# Patient Record
Sex: MALE | Race: WHITE | NOT HISPANIC OR LATINO | Employment: FULL TIME | URBAN - METROPOLITAN AREA
[De-identification: names, ages, dates, MRNs, and addresses within clinical notes are randomized per-mention and may not be internally consistent; named-entity substitution may affect disease eponyms.]

---

## 2017-09-06 ENCOUNTER — COMMUNICATION - HEALTHEAST (OUTPATIENT)
Dept: TELEHEALTH | Facility: CLINIC | Age: 47
End: 2017-09-06

## 2017-09-06 ENCOUNTER — OFFICE VISIT - HEALTHEAST (OUTPATIENT)
Dept: FAMILY MEDICINE | Facility: CLINIC | Age: 47
End: 2017-09-06

## 2017-09-06 DIAGNOSIS — Z30.09 ENCOUNTER FOR VASECTOMY COUNSELING: ICD-10-CM

## 2017-09-06 DIAGNOSIS — L91.8 SKIN TAGS, MULTIPLE ACQUIRED: ICD-10-CM

## 2017-09-06 DIAGNOSIS — Z00.00 ROUTINE GENERAL MEDICAL EXAMINATION AT A HEALTH CARE FACILITY: ICD-10-CM

## 2017-09-06 DIAGNOSIS — J30.2 SEASONAL ALLERGIC RHINITIS, UNSPECIFIED ALLERGIC RHINITIS TRIGGER: ICD-10-CM

## 2017-09-06 DIAGNOSIS — R06.83 SNORING: ICD-10-CM

## 2017-09-06 LAB
CHOLEST SERPL-MCNC: 177 MG/DL
FASTING STATUS PATIENT QL REPORTED: YES
HDLC SERPL-MCNC: 37 MG/DL
LDLC SERPL CALC-MCNC: 116 MG/DL
PSA SERPL-MCNC: 0.9 NG/ML (ref 0–2.5)
TRIGL SERPL-MCNC: 119 MG/DL

## 2017-09-06 ASSESSMENT — MIFFLIN-ST. JEOR: SCORE: 1780.92

## 2017-09-11 ENCOUNTER — AMBULATORY - HEALTHEAST (OUTPATIENT)
Dept: FAMILY MEDICINE | Facility: CLINIC | Age: 47
End: 2017-09-11

## 2017-09-11 DIAGNOSIS — E55.9 VITAMIN D DEFICIENCY: ICD-10-CM

## 2017-10-17 ENCOUNTER — RECORDS - HEALTHEAST (OUTPATIENT)
Dept: ADMINISTRATIVE | Facility: OTHER | Age: 47
End: 2017-10-17

## 2017-11-20 ENCOUNTER — COMMUNICATION - HEALTHEAST (OUTPATIENT)
Dept: LAB | Facility: CLINIC | Age: 47
End: 2017-11-20

## 2017-11-20 DIAGNOSIS — R74.8 ABNORMAL LIVER ENZYMES: ICD-10-CM

## 2017-11-20 DIAGNOSIS — E55.9 VITAMIN D DEFICIENCY: ICD-10-CM

## 2017-12-11 ENCOUNTER — AMBULATORY - HEALTHEAST (OUTPATIENT)
Dept: LAB | Facility: CLINIC | Age: 47
End: 2017-12-11

## 2017-12-11 DIAGNOSIS — E55.9 VITAMIN D DEFICIENCY: ICD-10-CM

## 2017-12-11 DIAGNOSIS — R74.8 ABNORMAL LIVER ENZYMES: ICD-10-CM

## 2017-12-14 ENCOUNTER — COMMUNICATION - HEALTHEAST (OUTPATIENT)
Dept: FAMILY MEDICINE | Facility: CLINIC | Age: 47
End: 2017-12-14

## 2017-12-14 DIAGNOSIS — E55.9 VITAMIN D DEFICIENCY: ICD-10-CM

## 2018-03-06 ENCOUNTER — COMMUNICATION - HEALTHEAST (OUTPATIENT)
Dept: ADMINISTRATIVE | Facility: CLINIC | Age: 48
End: 2018-03-06

## 2019-06-24 ENCOUNTER — OFFICE VISIT - HEALTHEAST (OUTPATIENT)
Dept: FAMILY MEDICINE | Facility: CLINIC | Age: 49
End: 2019-06-24

## 2019-06-24 DIAGNOSIS — Z00.00 ROUTINE GENERAL MEDICAL EXAMINATION AT A HEALTH CARE FACILITY: ICD-10-CM

## 2019-06-24 DIAGNOSIS — Z23 TETANUS-DIPHTHERIA (TD) VACCINATION: ICD-10-CM

## 2019-06-24 DIAGNOSIS — Z23 NEED FOR HEPATITIS A VACCINATION: ICD-10-CM

## 2019-06-24 LAB
ALBUMIN SERPL-MCNC: 4.3 G/DL (ref 3.5–5)
ALP SERPL-CCNC: 57 U/L (ref 45–120)
ALT SERPL W P-5'-P-CCNC: 104 U/L (ref 0–45)
ANION GAP SERPL CALCULATED.3IONS-SCNC: 9 MMOL/L (ref 5–18)
AST SERPL W P-5'-P-CCNC: 58 U/L (ref 0–40)
BILIRUB SERPL-MCNC: 1 MG/DL (ref 0–1)
BUN SERPL-MCNC: 11 MG/DL (ref 8–22)
CALCIUM SERPL-MCNC: 9.8 MG/DL (ref 8.5–10.5)
CHLORIDE BLD-SCNC: 104 MMOL/L (ref 98–107)
CHOLEST SERPL-MCNC: 162 MG/DL
CO2 SERPL-SCNC: 29 MMOL/L (ref 22–31)
CREAT SERPL-MCNC: 1.14 MG/DL (ref 0.7–1.3)
FASTING STATUS PATIENT QL REPORTED: NORMAL
GFR SERPL CREATININE-BSD FRML MDRD: >60 ML/MIN/1.73M2
GLUCOSE BLD-MCNC: 104 MG/DL (ref 70–125)
HDLC SERPL-MCNC: 43 MG/DL
LDLC SERPL CALC-MCNC: 90 MG/DL
POTASSIUM BLD-SCNC: 4.4 MMOL/L (ref 3.5–5)
PROT SERPL-MCNC: 7.1 G/DL (ref 6–8)
PSA SERPL-MCNC: 0.6 NG/ML (ref 0–2.5)
SODIUM SERPL-SCNC: 142 MMOL/L (ref 136–145)
TRIGL SERPL-MCNC: 145 MG/DL

## 2019-06-24 ASSESSMENT — MIFFLIN-ST. JEOR: SCORE: 1754.38

## 2019-07-29 ENCOUNTER — COMMUNICATION - HEALTHEAST (OUTPATIENT)
Dept: TELEHEALTH | Facility: CLINIC | Age: 49
End: 2019-07-29

## 2019-07-29 ENCOUNTER — COMMUNICATION - HEALTHEAST (OUTPATIENT)
Dept: HEALTH INFORMATION MANAGEMENT | Facility: CLINIC | Age: 49
End: 2019-07-29

## 2020-07-27 ENCOUNTER — OFFICE VISIT - HEALTHEAST (OUTPATIENT)
Dept: FAMILY MEDICINE | Facility: CLINIC | Age: 50
End: 2020-07-27

## 2020-07-27 DIAGNOSIS — Z00.00 ROUTINE GENERAL MEDICAL EXAMINATION AT A HEALTH CARE FACILITY: ICD-10-CM

## 2020-07-27 DIAGNOSIS — R06.83 SNORES: ICD-10-CM

## 2020-07-27 DIAGNOSIS — J30.2 SEASONAL ALLERGIES: ICD-10-CM

## 2020-07-27 DIAGNOSIS — Z12.11 SCREEN FOR COLON CANCER: ICD-10-CM

## 2020-07-27 LAB
ALBUMIN SERPL-MCNC: 4.4 G/DL (ref 3.5–5)
ALP SERPL-CCNC: 59 U/L (ref 45–120)
ALT SERPL W P-5'-P-CCNC: 84 U/L (ref 0–45)
ANION GAP SERPL CALCULATED.3IONS-SCNC: 10 MMOL/L (ref 5–18)
AST SERPL W P-5'-P-CCNC: 36 U/L (ref 0–40)
BILIRUB SERPL-MCNC: 0.9 MG/DL (ref 0–1)
BUN SERPL-MCNC: 12 MG/DL (ref 8–22)
CALCIUM SERPL-MCNC: 9.6 MG/DL (ref 8.5–10.5)
CHLORIDE BLD-SCNC: 103 MMOL/L (ref 98–107)
CHOLEST SERPL-MCNC: 171 MG/DL
CO2 SERPL-SCNC: 28 MMOL/L (ref 22–31)
CREAT SERPL-MCNC: 1.31 MG/DL (ref 0.7–1.3)
FASTING STATUS PATIENT QL REPORTED: YES
GFR SERPL CREATININE-BSD FRML MDRD: 58 ML/MIN/1.73M2
GLUCOSE BLD-MCNC: 205 MG/DL (ref 70–125)
HDLC SERPL-MCNC: 40 MG/DL
LDLC SERPL CALC-MCNC: 101 MG/DL
POTASSIUM BLD-SCNC: 4.9 MMOL/L (ref 3.5–5)
PROT SERPL-MCNC: 7.2 G/DL (ref 6–8)
PSA SERPL-MCNC: 0.7 NG/ML (ref 0–3.5)
SODIUM SERPL-SCNC: 141 MMOL/L (ref 136–145)
TRIGL SERPL-MCNC: 151 MG/DL

## 2020-07-27 ASSESSMENT — MIFFLIN-ST. JEOR: SCORE: 1745.99

## 2020-08-26 ENCOUNTER — RECORDS - HEALTHEAST (OUTPATIENT)
Dept: ADMINISTRATIVE | Facility: OTHER | Age: 50
End: 2020-08-26

## 2020-10-14 ENCOUNTER — VIRTUAL VISIT (OUTPATIENT)
Dept: SLEEP MEDICINE | Facility: CLINIC | Age: 50
End: 2020-10-14
Payer: COMMERCIAL

## 2020-10-14 DIAGNOSIS — R29.818 SUSPECTED SLEEP APNEA: Primary | ICD-10-CM

## 2020-10-14 DIAGNOSIS — R06.81 WITNESSED APNEIC SPELLS: ICD-10-CM

## 2020-10-14 DIAGNOSIS — R06.83 SNORING: ICD-10-CM

## 2020-10-14 PROCEDURE — 99203 OFFICE O/P NEW LOW 30 MIN: CPT | Mod: GT | Performed by: INTERNAL MEDICINE

## 2020-10-14 NOTE — PATIENT INSTRUCTIONS
"MY TREATMENT INFORMATION FOR SLEEP APNEA-  Tyler Turk    DOCTOR : Jacob Valverde MD, MD  SLEEP CENTER :      MY CONTACT NUMBER:     Am I having a sleep study at a sleep center?  Make sure you have an appointment for the study before you leave!    Am I having a home sleep study?  Watch this video:  /drop off device-   Https://[email or web address]/watch?v=yGGFBdELGhk  Disposable device sent out require phone/computer application-   [email or web address]/watch?v=BCce_vbiwxE  Please verify your insurance coverage with your insurance carrier    Frequently asked questions:  1. What is Obstructive Sleep Apnea (RONEY)? RONEY is the most common type of sleep apnea. Apnea means, \"without breath.\"  Apnea is most often caused by narrowing or collapse of the upper airway as muscles relax during sleep.   Almost everyone has occasional apneas. Most people with sleep apnea have had brief interruptions at night frequently for many years.  The severity of sleep apnea is related to how frequent and severe the events are.   2. What are the consequences of RONEY? Symptoms include: feeling sleepy during the day, snoring loudly, gasping or stopping of breathing, trouble sleeping, and occasionally morning headaches or heartburn at night.  Sleepiness can be serious and even increase the risk of falling asleep while driving. Other health consequences may include development of high blood pressure and other cardiovascular disease in persons who are susceptible. Untreated RONEY  can contribute to heart disease, stroke and diabetes.   3. What are the treatment options? In most situations, sleep apnea is a lifelong disease that must be managed with daily therapy. Medications are not effective for sleep apnea and surgery is generally not considered until other therapies have been tried. Your treatment is your choice . Continuous Positive Airway (CPAP) works right away and is the therapy that is effective in nearly everyone. An oral device to " hold your jaw forward is usually the next most reliable option. Other options include postioning devices (to keep you off your back), weight loss, and surgery including a tongue pacing device. There is more detail about some of these options below.    Important tips for using CPAP and similar devices   Know your equipment:  CPAP is continuous positive airway pressure that prevents obstructive sleep apnea by keeping the throat from collapsing while you are sleeping. In most cases, the device is  smart  and can slowly self-adjusts if your throat collapses and keeps a record every day of how well you are treated-this information is available to you and your care team.  BPAP is bilevel positive airway pressure that keeps your throat open and also assists each breath with a pressure boost to maintain adequate breathing.  Special kinds of BPAP are used in patients who have inadequate breathing from lung or heart disease. In most cases, the device is  smart  and can slowly self-adjusts to assist breathing. Like CPAP, the device keeps a record of how well you are treated.  Your mask is your connection to the device. You get to choose what feels most comfortable and the staff will help to make sure if fits. Here: are some examples of the different masks that are available:       Key points to remember on your journey with sleep apnea:  1. Sleep study.  PAP devices often need to be adjusted during a sleep study to show that they are effective and adjusted right.  2. Good tips to remember: Try wearing just the mask during a quiet time during the day so your body adapts to wearing it. A humidifier is recommended for comfort in most cases to prevent drying of your nose and throat. Allergy medication from your provider may help you if you are having nasal congestion.  3. Getting settled-in. It takes more than one night for most of us to get used to wearing a mask. Try wearing just the mask during a quiet time during the day so  your body adapts to wearing it. A humidifier is recommended for comfort in most cases. Our team will work with you carefully on the first day and will be in contact within 4 days and again at 2 and 4 weeks for advice and remote device adjustments. Your therapy is evaluated by the device each day.   4. Use it every night. The more you are able to sleep naturally for 7-8 hours, the more likely you will have good sleep and to prevent health risks or symptoms from sleep apnea. Even if you use it 4 hours it helps. Occasionally all of us are unable to use a medical therapy, in sleep apnea, it is not dangerous to miss one night.   5. Communicate. Call our skilled team on the number provided on the first day if your visit for problems that make it difficult to wear the device. Over 2 out of 3 patients can learn to wear the device long-term with help from our team. Remember to call our team or your sleep providers if you are unable to wear the device as we may have other solutions for those who cannot adapt to mask CPAP therapy. It is recommended that you sleep your sleep provider within the first 3 months and yearly after that if you are not having problems.   6. Use it for your health. We encourage use of CPAP masks during daytime quiet periods to allow your face and brain to adapt to the sensation of CPAP so that it will be a more natural sensation to awaken to at night or during naps. This can be very useful during the first few weeks or months of adapting to CPAP though it does not help medically to wear CPAP during wakefulness and  should not be used as a strategy just to meet guidelines.  7. Take care of your equipment. Make sure you clean your mask and tubing using directions every day and that your filter and mask are replaced as recommended or if they are not working.     BESIDES CPAP, WHAT OTHER THERAPIES ARE THERE?    Positioning Device  Positioning devices are generally used when sleep apnea is mild and only  occurs on your back.This example shows a pillow that straps around the waist. It may be appropriate for those whose sleep study shows milder sleep apnea that occurs primarily when lying flat on one's back. Preliminary studies have shown benefit but effectiveness at home may need to be verified by a home sleep test. These devices are generally not covered by medical insurance.  Examples of devices that maintain sleeping on the back to prevent snoring and mild sleep apnea.    Belt type body positioner  Http://Nephera.Hulafrog/    Electronic reminder  Http://nightshifttherapy.com/  Http://[email or web address].Hulafrog.au/      Oral Appliance  What is oral appliance therapy?  An oral appliance device fits on your teeth at night like a retainer used after having braces. The device is made by a specialized dentist and requires several visits over 1-2 months before a manufactured device is made to fit your teeth and is adjusted to prevent your sleep apnea. Once an oral device is working properly, snoring should be improved. A home sleep test may be recommended at that time if to determine whether the sleep apnea is adequately treated.       Some things to remember:  -Oral devices are often, but not always, covered by your medical insurance. Be sure to check with your insurance provider.   -If you are referred for oral therapy, you will be given a list of specialized dentists to consider or you may choose to visit the Web site of the American Academy of Dental Sleep Medicine  -Oral devices are less likely to work if you have severe sleep apnea or are extremely overweight.     More detailed information  An oral appliance is a small acrylic device that fits over the upper and lower teeth  (similar to a retainer or a mouth guard). This device slightly moves jaw forward, which moves the base of the tongue forward, opens the airway, improves breathing for effective treat snoring and obstructive sleep apnea in perhaps 7 out of 10 people .  The  best working devices are custom-made by a dental device  after a mold is made of the teeth 1, 2, 3.  When is an oral appliance indicated?  Oral appliance therapy is recommended as a first-line treatment for patients with primary snoring, mild sleep apnea, and for patients with moderate sleep apnea who prefer appliance therapy to use of CPAP4, 5. Severity of sleep apnea is determined by sleep testing and is based on the number of respiratory events per hour of sleep.   How successful is oral appliance therapy?  The success rate of oral appliance therapy in patients with mild sleep apnea is 75-80% while in patients with moderate sleep apnea it is 50-70%. The chance of success in patients with severe sleep apnea is 40-50%. The research also shows that oral appliances have a beneficial effect on the cardiovascular health of RONEY patients at the same magnitude as CPAP therapy7.  Oral appliances should be a second-line treatment in cases of severe sleep apnea, but if not completely successful then a combination therapy utilizing CPAP plus oral appliance therapy may be effective. Oral appliances tend to be effective in a broad range of patients although studies show that the patients who have the highest success are females, younger patients, those with milder disease, and less severe obesity. 3, 6.   Finding a dentist that practices dental sleep medicine  Specific training is available through the American Academy of Dental Sleep Medicine for dentists interested in working in the field of sleep. To find a dentist who is educated in the field of sleep and the use of oral appliances, near you, visit the Web site of the American Academy of Dental Sleep Medicine.    References  1. Lazaro et al. Objectively measured vs self-reported compliance during oral appliance therapy for sleep-disordered breathing. Chest 2013; 144(5): 4154-4304.  2. Brenda et al. Objective measurement of compliance during oral  appliance therapy for sleep-disordered breathing. Thorax 2013; 68(1): 91-96.  3. Renetta, et al. Mandibular advancement devices in 620 men and women with RONEY and snoring: tolerability and predictors of treatment success. Chest 2004; 125: 3681-5937.  4. Micaela et al. Oral appliances for snoring and RONEY: a review. Sleep 2006; 29: 244-262.  5. Fausto et al. Oral appliance treatment for RONEY: an update. J Clin Sleep Med 2014; 10(2): 215-227.  6. Audelia et al. Predictors of OSAH treatment outcome. J Dent Res 2007; 86: 2055-3702.      Weight Loss:    Weight loss is a long-term strategy that may improve sleep apnea in some patients.    Weight management is a personal decision and the decision should be based on your interest and the potential benefits.  If you are interested in exploring weight loss strategies, the following discussion covers the impact on weight loss on sleep apnea and the approaches that may be successful.    Being overweight does not necessarily mean you will have health consequences.  Those who have BMI over 35 or over 27 with existing medical conditions carries greater risk.   Weight loss decreases severity of sleep apnea in most people with obesity. For those with mild obesity who have developed snoring with weight gain, even 15-30 pound weight loss can improve and occasionally eliminate sleep apnea.  Structured and life-long dietary and health habits are necessary to lose weight and keep healthier weight levels.     Though there may be significant health benefits from weight loss, long-term weight loss is very difficult to achieve- studies show success with dietary management in less than 10% of people. In addition, substantial weight loss may require years of dietary control and may be difficult if patients have severe obesity. In these cases, surgical management may be considered.  Finally, older individuals who have tolerated obesity without health complications may be less likely to  benefit from weight loss strategies.        Your BMI is There is no height or weight on file to calculate BMI.  Weight management is a personal decision.  If you are interested in exploring weight loss strategies, the following discussion covers the approaches that may be successful. Body mass index (BMI) is one way to tell whether you are at a healthy weight, overweight, or obese. It measures your weight in relation to your height.  A BMI of 18.5 to 24.9 is in the healthy range. A person with a BMI of 25 to 29.9 is considered overweight, and someone with a BMI of 30 or greater is considered obese. More than two-thirds of American adults are considered overweight or obese.  Being overweight or obese increases the risk for further weight gain. Excess weight may lead to heart disease and diabetes.  Creating and following plans for healthy eating and physical activity may help you improve your health.  Weight control is part of healthy lifestyle and includes exercise, emotional health, and healthy eating habits. Careful eating habits lifelong are the mainstay of weight control. Though there are significant health benefits from weight loss, long-term weight loss with diet alone may be very difficult to achieve- studies show long-term success with dietary management in less than 10% of people. Attaining a healthy weight may be especially difficult to achieve in those with severe obesity. In some cases, medications, devices and surgical management might be considered.  What can you do?  If you are overweight or obese and are interested in methods for weight loss, you should discuss this with your provider.     Consider reducing daily calorie intake by 500 calories.     Keep a food journal.     Avoiding skipping meals, consider cutting portions instead.    Diet combined with exercise helps maintain muscle while optimizing fat loss. Strength training is particularly important for building and maintaining muscle mass.  Exercise helps reduce stress, increase energy, and improves fitness. Increasing exercise without diet control, however, may not burn enough calories to loose weight.       Start walking three days a week 10-20 minutes at a time    Work towards walking thirty minutes five days a week     Eventually, increase the speed of your walking for 1-2 minutes at time    In addition, we recommend that you review healthy lifestyles and methods for weight loss available through the National Institutes of Health patient information sites:  http://win.niddk.nih.gov/publications/index.htm    And look into health and wellness programs that may be available through your health insurance provider, employer, local community center, or hi club.    Weight management plan: Patient was referred to their PCP to discuss a diet and exercise plan.      Surgery:    Surgery for obstructive sleep apnea is considered generally only when other therapies fail to work. Surgery may be discussed with you if you are having a difficult time tolerating CPAP and or when there is an abnormal structure that requires surgical correction.  Nose and throat surgeries often enlarge the airway to prevent collapse.  Most of these surgeries create pain for 1-2 weeks and up to half of the most common surgeries are not effective throughout life.  You should carefully discuss the benefits and drawbacks to surgery with your sleep provider and surgeon to determine if it is the best solution for you.   More information  Surgery for RONEY is directed at areas that are responsible for narrowing or complete obstruction of the airway during sleep.  There are a wide range of procedures available to enlarge and/or stabilize the airway to prevent blockage of breathing in the three major areas where it can occur: the palate, tongue, and nasal regions.  Successful surgical treatment depends on the accurate identification of the factors responsible for obstructive sleep apnea in  each person.  A personalized approach is required because there is no single treatment that works well for everyone.  Because of anatomic variation, consultation with an examination by a sleep surgeon is a critical first step in determining what surgical options are best for each patient.  In some cases, examination during sedation may be recommended in order to guide the selection of procedures.  Patients will be counseled about risks and benefits as well as the typical recovery course after surgery. Surgery is typically not a cure for a person s RONEY.  However, surgery will often significantly improve one s RONEY severity (termed  success rate ).  Even in the absence of a cure, surgery will decrease the cardiovascular risk associated with OSA7; improve overall quality of life8 (sleepiness, functionality, sleep quality, etc).      Palate Procedures:  Patients with RONEY often have narrowing of their airway in the region of their tonsils and uvula.  The goals of palate procedures are to widen the airway in this region as well as to help the tissues resist collapse.  Modern palate procedure techniques focus on tissue conservation and soft tissue rearrangement, rather than tissue removal.  Often the uvula is preserved in this procedure. Residual sleep apnea is common in patient after pharyngoplasty with an average reduction in sleep apnea events of 33%2.      Tongue Procedures:  ExamWhile patients are awake, the muscles that surround the throat are active and keep this region open for breathing. These muscles relax during sleep, allowing the tongue and other structures to collapse and block breathing.  There are several different tongue procedures available.  Selection of a tongue base procedure depends on characteristics seen on physical exam.  Generally, procedures are aimed at removing bulky tissues in this area or preventing the back of the tongue from falling back during sleep.  Success rates for tongue surgery range  from 50-62%3.    Hypoglossal Nerve Stimulation:  Hypoglossal nerve stimulation has recently received approval from the [place] Food and Drug Administration for the treatment of obstructive sleep apnea.  This is based on research showing that the system was safe and effective in treating sleep apnea6.  Results showed that the median AHI score decreased 68%, from 29.3 to 9.0. This therapy uses an implant system that senses breathing patterns and delivers mild stimulation to airway muscles, which keeps the airway open during sleep.  The system consists of three fully implanted components: a small generator (similar in size to a pacemaker), a breathing sensor, and a stimulation lead.  Using a small handheld remote, a patient turns the therapy on before bed and off upon awakening.    Candidates for this device must be greater than 22 years of age, have moderate to severe RONEY (AHI between 20-65), BMI less than 32, have tried CPAP/oral appliance without success, and have appropriate upper airway anatomy (determined by a sleep endoscopy performed by Dr. Calvillo).    Hypoglossal Nerve Stimulation Pathway:    The sleep surgeon s office will work with the patient through the insurance prior-authorization process (including communications and appeals).    Nasal Procedures:  Nasal obstruction can interfere with nasal breathing during the day and night.  Studies have shown that relief of nasal obstruction can improve the ability of some patients to tolerate positive airway pressure therapy for obstructive sleep apnea1.  Treatment options include medications such as nasal saline, topical corticosteroid and antihistamine sprays, and oral medications such as antihistamines or decongestants. Non-surgical treatments can include external nasal dilators for selected patients. If these are not successful by themselves, surgery can improve the nasal airway either alone or in combination with these other options.      Combination  Procedures:  Combination of surgical procedures and other treatments may be recommended, particularly if patients have more than one area of narrowing or persistent positional disease.  The success rate of combination surgery ranges from 66-80%2,3.    References  1. Irene MONTOYA. The Role of the Nose in Snoring and Obstructive Sleep Apnoea: An Update.  Eur Arch Otorhinolaryngol. 2011; 268: 1365-73.  2.  Francesco SM; Jordan JA; Too JR; Pallanch JF; Lory MB; Kasey SG; Katarzyna MORELOS. Surgical modifications of the upper airway for obstructive sleep apnea in adults: a systematic review and meta-analysis. SLEEP 2010;33(10):5177-3345. Pradip VELEZ. Hypopharyngeal surgery in obstructive sleep apnea: an evidence-based medicine review.  Arch Otolaryngol Head Neck Surg. 2006 Feb;132(2):206-13.  3. Mono YH1, Lima Y, Dayne MICHAEL. The efficacy of anatomically based multilevel surgery for obstructive sleep apnea. Otolaryngol Head Neck Surg. 2003 Oct;129(4):327-35.  4. Pradip VELEZ, Goldberg A. Hypopharyngeal Surgery in Obstructive Sleep Apnea: An Evidence-Based Medicine Review. Arch Otolaryngol Head Neck Surg. 2006 Feb;132(2):206-13.  5. Jeniffer TREJO et al. Upper-Airway Stimulation for Obstructive Sleep Apnea.  N Engl J Med. 2014 Jan 9;370(2):139-49.  6. Chantel Y et al. Increased Incidence of Cardiovascular Disease in Middle-aged Men with Obstructive Sleep Apnea. Am J Respir Crit Care Med; 2002 166: 159-165  7. Neda FERNANDEZ et al. Studying Life Effects and Effectiveness of Palatopharyngoplasty (SLEEP) study: Subjective Outcomes of Isolated Uvulopalatopharyngoplasty. Otolaryngol Head Neck Surg. 2011; 144: 623-631.

## 2020-10-14 NOTE — PROGRESS NOTES
"Tyler Turk is a 50 year old male who is being evaluated via a billable video visit.      The patient has been notified of following:     \"This video visit will be conducted via a call between you and your physician/provider. We have found that certain health care needs can be provided without the need for an in-person physical exam.  This service lets us provide the care you need with a video conversation.  If a prescription is necessary we can send it directly to your pharmacy.  If lab work is needed we can place an order for that and you can then stop by our lab to have the test done at a later time.    Video visits are billed at different rates depending on your insurance coverage.  Please reach out to your insurance provider with any questions.    If during the course of the call the physician/provider feels a video visit is not appropriate, you will not be charged for this service.\"    Patient has given verbal consent for Video visit? Yes  How would you like to obtain your AVS? MyChart  If you are dropped from the video visit, the video invite should be resent to: Send to e-mail at: [email or web address]  Will anyone else be joining your video visit? No        Video-Visit Details    Type of service:  Video Visit    Video Start Time: 10:20 AM  Video End Time: 10:52 AM    Originating Location (pt. Location): Home    Distant Location (provider location):  [facility] SLEEP [facility]     Platform used for Video Visit: Channing Valverde MD      Sleep Consultation:    Date on this visit: 10/14/2020    Tyler Turk is sent by Paddy Raymond for a sleep consultation regarding possible sleep apnea.    Primary Physician: Paddy Raymond     Chief complaint: snoring    Presenting History:     Tyler Turk reports nightly snoring and frequent witnessed apneas for last 2 years.     He doesnot have any significant medical comorbidity.     Tyler does snore every night. " Patient does have a regular bed partner. There is report of snoring.  He does have witnessed apneas. They never sleep separately.  Patient sleeps on his side. He denies no morning headaches and restless legs. Tyler denies any bruxism, sleep walking, sleep talking, dream enactment, sleep paralysis, cataplexy and hypnogogic/hypnopompic hallucinations.    Tyler goes to sleep at 11:00 PM during the week. He wakes up at 7:00 AM without an alarm. He falls asleep in 15 minutes.  Tyler denies difficulty falling asleep.  He wakes up 0-1 times a night for 10 minutes before falling back to sleep.  Tyler wakes up to go to the bathroom and uncertain reasons.  On weekends, Tyler goes to sleep at 11:00 PM.  He wakes up at 7:30 AM without an alarm. He falls asleep in 10 minutes.  Patient gets an average of 7-8 hours of sleep per night.     Tyler has difficulty breathing through his nose.      Patient feels tired during the day and has noticed some cognitive difficulties.  Patient's [place] Sleepiness score 9/24 consistent with no daytime sleepiness.      Tyler naps 3-4 times per week for 10-20 minutes, feels refreshed after naps. He takes some inadvertant naps.  He denies closing eyes, dozing and falling asleep while driving. Patient was counseled on the importance of driving while alert, to pull over if drowsy, or nap before getting into the vehicle if sleepy.      He uses 2 sodas/day. Last caffeine intake is usually before noon.    Allergies:    No Known Allergies    Medications:    No current outpatient medications on file.       Problem List:  There are no active problems to display for this patient.       Past Medical/Surgical History:    - Nothing significant     Social History:  Social History     Socioeconomic History     Marital status:      Spouse name: Not on file     Number of children: Not on file     Years of education: Not on file     Highest education level: Not on file   Occupational  History     Not on file   Social Needs     Financial resource strain: Not on file     Food insecurity     Worry: Not on file     Inability: Not on file     Transportation needs     Medical: Not on file     Non-medical: Not on file   Tobacco Use     Smoking status: Never Smoker     Smokeless tobacco: Never Used   Substance and Sexual Activity     Alcohol use: Not on file     Drug use: Not on file     Sexual activity: Not on file   Lifestyle     Physical activity     Days per week: Not on file     Minutes per session: Not on file     Stress: Not on file   Relationships     Social connections     Talks on phone: Not on file     Gets together: Not on file     Attends Alevism service: Not on file     Active member of club or organization: Not on file     Attends meetings of clubs or organizations: Not on file     Relationship status: Not on file     Intimate partner violence     Fear of current or ex partner: Not on file     Emotionally abused: Not on file     Physically abused: Not on file     Forced sexual activity: Not on file   Other Topics Concern     Not on file   Social History Narrative     Not on file       Family History:  Family History   Problem Relation Age of Onset     Sleep Apnea Father      Sleep Apnea Sister        Review of Systems:  A complete review of systems reviewed by me is negative with the exeption of what has been mentioned in the history of present illness.  CONSTITUTIONAL: NEGATIVE for weight gain/loss, fever, chills, sweats or night sweats, drug allergies.  EYES: NEGATIVE for changes in vision, blind spots, double vision.  ENT: NEGATIVE for ear pain, sore throat, sinus pain, post-nasal drip, runny nose, bloody nose  CARDIAC: NEGATIVE for fast heartbeats or fluttering in chest, chest pain or pressure, breathlessness when lying flat, swollen legs or swollen feet.  NEUROLOGIC: NEGATIVE headaches, weakness or numbness in the arms or legs.  DERMATOLOGIC: NEGATIVE for rashes, new moles or change  in mole(s)  PULMONARY: NEGATIVE SOB at rest, SOB with activity, dry cough, productive cough, coughing up blood, wheezing or whistling when breathing.    GASTROINTESTINAL: NEGATIVE for nausea or vomitting, loose or watery stools, fat or grease in stools, constipation, abdominal pain, bowel movements black in color or blood noted.  GENITOURINARY: NEGATIVE for pain during urination, blood in urine, urinating more frequently than usual, irregular menstrual periods.  MUSCULOSKELETAL: NEGATIVE for muscle pain, bone or joint pain, swollen joints.  ENDOCRINE: NEGATIVE for increased thirst or urination, diabetes.  LYMPHATIC: NEGATIVE for swollen lymph nodes, lumps or bumps in the breasts or nipple discharge.    Physical Examination:  Vitals: There were no vitals taken for this visit.  BMI= There is no height or weight on file to calculate BMI.         [place] Total Score 10/14/2020   Total score - [place] 9       MADY Total Score: 17 (10/14/20 1000)    GENERAL APPEARANCE: healthy, alert, active and no distress  EYES: Eyes grossly normal to inspection  HENT: Normocephalic   RESP: No cough, no dyspnea   MS: extremities normal- no gross deformities noted  SKIN: no suspicious lesions or rashes  NEURO: mentation intact and speech normal  PSYCH: mentation appears normal and affect normal/bright      Impression/Plan:    1. Probable Obstructive sleep apnea    - Patient is a 50 years old male, who presents with history of loud snoring, witnessed apneas and daytime fatigue. There is a high risk for sleep apnea and an overnight sleep study is recommended for further assessment.     Plan:     1. Home sleep apnea testing       Literature provided regarding sleep apnea.      He will follow up with me in approximately two weeks after his sleep study has been competed to review the results and discuss plan of care.       Polysomnography & HST reviewed.  Limitations of HST reviewed  Obstructive sleep apnea reviewed.  Complications of  untreated sleep apnea were reviewed.    I spent a total of 30 minutes for this appointment today which include time spent before, during and after the visit for patient care, counseling and coordination of care.    Dr. Jacob Valverde     CC: Paddy Raymond

## 2020-10-15 ENCOUNTER — TELEPHONE (OUTPATIENT)
Dept: SLEEP MEDICINE | Facility: CLINIC | Age: 50
End: 2020-10-15

## 2020-10-26 ENCOUNTER — OFFICE VISIT (OUTPATIENT)
Dept: SLEEP MEDICINE | Facility: CLINIC | Age: 50
End: 2020-10-26
Payer: COMMERCIAL

## 2020-10-26 DIAGNOSIS — R06.83 SNORING: ICD-10-CM

## 2020-10-26 DIAGNOSIS — R29.818 SUSPECTED SLEEP APNEA: ICD-10-CM

## 2020-10-26 DIAGNOSIS — G47.33 OSA (OBSTRUCTIVE SLEEP APNEA): ICD-10-CM

## 2020-10-26 DIAGNOSIS — R06.81 WITNESSED APNEIC SPELLS: ICD-10-CM

## 2020-10-26 PROCEDURE — G0399 HOME SLEEP TEST/TYPE 3 PORTA: HCPCS | Performed by: INTERNAL MEDICINE

## 2020-10-27 ENCOUNTER — DOCUMENTATION ONLY (OUTPATIENT)
Dept: SLEEP MEDICINE | Facility: CLINIC | Age: 50
End: 2020-10-27
Payer: COMMERCIAL

## 2020-10-28 PROBLEM — G47.33 OSA (OBSTRUCTIVE SLEEP APNEA): Status: ACTIVE | Noted: 2020-10-28

## 2020-10-28 NOTE — PROGRESS NOTES
This HSAT was performed using a Noxturnal T3 device which recorded snore, sound, movement activity, body position, nasal pressure, oronasal thermal airflow, pulse, oximetry and both chest and abdominal respiratory effort. HSAT data was restricted to the time patient states they were in bed.     HSAT was scored using 1B 4% hypopnea rule.     HST AHI (Non-PAT): 30.5  Snoring was reported as loud.  Time with SpO2 below 89% was 80 minutes.   Overall signal quality was fair. Poor airflow and pulse ox signals at times.      Pt will follow up with sleep provider to determine appropriate therapy.   HST POST-STUDY QUESTIONNAIRE    1. What time did you go to bed?  11  2. How long do you think it took to fall asleep?  40 min  3. What time did you wake up to start the day?  800  4. Did you get up during the night at all?  no  5. If you woke up, do you remember approximately what time(s)? no  6. Did you have any difficulty with the equipment?  No  7. Did you us any type of treatment with this study?  None  8. Was the head of the bed elevated? No  9. Did you sleep in a recliner?  No  10. Did you stop using CPAP at least 3 days before this test?  NA  11. Any other information you'd like us to know? none

## 2020-10-29 NOTE — PROCEDURES
HOME SLEEP STUDY INTERPRETATION    Patient: Tyler Turk  MRN: [id number]  YOB: 1970  Study Date: 10/26/2020  Referring Provider: Paddy Raymond;   Ordering Provider: Jacob Valverde MD, MD     Indications for Home Study: Tyler Turk is a 50 year old male with a history of no significant medical comorbidity who presents with symptoms suggestive of obstructive sleep apnea.    There is no height or weight on file to calculate BMI.  Total score - [place]: 9 (10/14/2020 10:00 AM)  STOP-BAN/8    Data: A full night home sleep study was performed recording the standard physiologic parameters including body position, movement, sound, nasal pressure, thermal oral airflow, chest and abdominal movements with respiratory inductance plethysmography, and oxygen saturation by pulse oximetry. Pulse rate was estimated by oximetry recording. This study was considered adequate based on > 4 hours of quality oximetry and respiratory recording. As specified by the AASM Manual for the Scoring of Sleep and Associated events, version 2.3, Rule VIII.D 1B, 4% oxygen desaturation scoring for hypopneas is used as a standard of care on all home sleep apnea testing.    Analysis Time:  528.8 minutes    Respiration:   Sleep Associated Hypoxemia: sustained hypoxemia was present. Baseline oxygen saturation was 90.8%.  Time with saturation less than or equal to 88% was 80 minutes. The lowest oxygen saturation was 78%.   Snoring: Snoring was present.  Respiratory events: The home study revealed a presence of 208 obstructive apneas and 7 mixed and central apneas. There were 54 hypopneas resulting in a combined apnea/hypopnea index [AHI] of 30.5 events per hour.  AHI was 30.7 per hour supine, - per hour prone, 16.2 per hour on left side, and - per hour on right side.   Pattern: Excluding events noted above, respiratory rate and pattern was Normal.    Position: Percent of time spent: supine - 98.6%, prone - 0%, on left  - 1.4%, on right - 0%.    Heart Rate: By pulse oximetry normal rate was noted.     Assessment:   Severe obstructive sleep apnea.  Sleep associated hypoxemia was present.    Recommendations:  Consider auto-CPAP at 5-15 cmH2O.  Weight management.    Diagnosis Code(s): Obstructive Sleep Apnea G47.33, Hypoxemia G47.36    Jacob Valverde MD, MD, October 28, 2020   Diplomate, American Board of Psychiatry and Neurology, Sleep Medicine

## 2020-11-10 ENCOUNTER — VIRTUAL VISIT (OUTPATIENT)
Dept: SLEEP MEDICINE | Facility: CLINIC | Age: 50
End: 2020-11-10
Payer: COMMERCIAL

## 2020-11-10 DIAGNOSIS — G47.33 OSA (OBSTRUCTIVE SLEEP APNEA): Primary | ICD-10-CM

## 2020-11-10 PROCEDURE — 99213 OFFICE O/P EST LOW 20 MIN: CPT | Mod: GT | Performed by: INTERNAL MEDICINE

## 2020-11-10 NOTE — PATIENT INSTRUCTIONS

## 2020-11-10 NOTE — PROGRESS NOTES
"Tyler Turk is a 50 year old male who is being evaluated via a billable video visit.      The patient has been notified of following:     \"This video visit will be conducted via a call between you and your physician/provider. We have found that certain health care needs can be provided without the need for an in-person physical exam.  This service lets us provide the care you need with a video conversation.  If a prescription is necessary we can send it directly to your pharmacy.  If lab work is needed we can place an order for that and you can then stop by our lab to have the test done at a later time.    Video visits are billed at different rates depending on your insurance coverage.  Please reach out to your insurance provider with any questions.    If during the course of the call the physician/provider feels a video visit is not appropriate, you will not be charged for this service.\"    Patient has given verbal consent for Video visit? Yes  How would you like to obtain your AVS? MyChart  If you are dropped from the video visit, the video invite should be resent to: Send to e-mail at: [email or web address]  Will anyone else be joining your video visit? No        Video-Visit Details    Type of service:  Video Visit    Video Start Time: 11:16 AM  Video End Time: 11:31 AM    Originating Location (pt. Location): Home    Distant Location (provider location):  [facility] SLEEP [facility]     Platform used for Video Visit: Felipe Valverde MD      Sleep Study Follow-Up Visit:    Date on this visit: 11/10/2020    Tyler Turk comes in today for follow-up of his sleep study done on 10/26/20 at the [facility] for possible sleep apnea.    Respiratory events: The home study revealed a presence of 208 obstructive apneas and 7 mixed and central apneas. There were 54 hypopneas resulting in a combined apnea/hypopnea index [AHI] of 30.5 events per hour.  AHI was 30.7 per hour " supine, - per hour prone, 16.2 per hour on left side, and - per hour on right side.   Pattern: Excluding events noted above, respiratory rate and pattern was Normal.    Sleep Associated Hypoxemia: sustained hypoxemia was present. Baseline oxygen saturation was 90.8%.  Time with saturation less than or equal to 88% was 80 minutes. The lowest oxygen saturation was 78%.   Snoring: Snoring was present.     Position: Percent of time spent: supine - 98.6%, prone - 0%, on left - 1.4%, on right - 0%.     Heart Rate: By pulse oximetry normal rate was noted.      Assessment:   Severe obstructive sleep apnea.  Sleep associated hypoxemia was present.    These findings were reviewed with patient.     Past medical/surgical history, family history, social history, medications and allergies were reviewed.      Problem List:  Patient Active Problem List    Diagnosis Date Noted     RONEY (obstructive sleep apnea) 10/28/2020     Priority: Medium     Severe RONEY          Impression/Plan:    1. Severe Obstructive sleep apnea    -Home sleep test results were discussed in detail.  Severe obstructive sleep apnea, consequences of untreated disease and management options were reviewed.  CPAP therapy is recommended for a severe sleep apnea, which patient is willing to start.    Plan:     1. Start auto PAP therapy     He will follow up with me in about 2 month(s).     I spent a total of 15 minutes for this appointment today which include time spent before, during and after the visit for patient care, counseling and coordination of care.    Dr. Jacob Valverde     CC: Paddy Raymond

## 2020-12-03 ENCOUNTER — COMMUNICATION - HEALTHEAST (OUTPATIENT)
Dept: LAB | Facility: CLINIC | Age: 50
End: 2020-12-03

## 2020-12-03 DIAGNOSIS — R89.9 ABNORMAL LABORATORY TEST: ICD-10-CM

## 2020-12-10 ENCOUNTER — AMBULATORY - HEALTHEAST (OUTPATIENT)
Dept: NURSING | Facility: CLINIC | Age: 50
End: 2020-12-10

## 2020-12-10 ENCOUNTER — AMBULATORY - HEALTHEAST (OUTPATIENT)
Dept: LAB | Facility: CLINIC | Age: 50
End: 2020-12-10

## 2020-12-10 ENCOUNTER — AMBULATORY - HEALTHEAST (OUTPATIENT)
Dept: FAMILY MEDICINE | Facility: CLINIC | Age: 50
End: 2020-12-10

## 2020-12-10 DIAGNOSIS — R89.9 ABNORMAL LABORATORY TEST: ICD-10-CM

## 2020-12-10 LAB
ANION GAP SERPL CALCULATED.3IONS-SCNC: 10 MMOL/L (ref 5–18)
BUN SERPL-MCNC: 13 MG/DL (ref 8–22)
CALCIUM SERPL-MCNC: 9.2 MG/DL (ref 8.5–10.5)
CHLORIDE BLD-SCNC: 102 MMOL/L (ref 98–107)
CO2 SERPL-SCNC: 28 MMOL/L (ref 22–31)
CREAT SERPL-MCNC: 1.38 MG/DL (ref 0.7–1.3)
GFR SERPL CREATININE-BSD FRML MDRD: 54 ML/MIN/1.73M2
GLUCOSE BLD-MCNC: 225 MG/DL (ref 70–125)
HBA1C MFR BLD: 8.5 %
POTASSIUM BLD-SCNC: 3.9 MMOL/L (ref 3.5–5)
SODIUM SERPL-SCNC: 140 MMOL/L (ref 136–145)

## 2020-12-11 LAB
25(OH)D3 SERPL-MCNC: 39.1 NG/ML (ref 30–80)
25(OH)D3 SERPL-MCNC: 39.1 NG/ML (ref 30–80)

## 2021-01-04 ENCOUNTER — HEALTH MAINTENANCE LETTER (OUTPATIENT)
Age: 51
End: 2021-01-04

## 2021-01-14 ENCOUNTER — COMMUNICATION - HEALTHEAST (OUTPATIENT)
Dept: FAMILY MEDICINE | Facility: CLINIC | Age: 51
End: 2021-01-14

## 2021-01-19 ENCOUNTER — OFFICE VISIT - HEALTHEAST (OUTPATIENT)
Dept: FAMILY MEDICINE | Facility: CLINIC | Age: 51
End: 2021-01-19

## 2021-01-19 DIAGNOSIS — R74.8 ELEVATED LIVER ENZYMES: ICD-10-CM

## 2021-01-19 DIAGNOSIS — E11.9 TYPE 2 DIABETES MELLITUS WITHOUT COMPLICATION, WITHOUT LONG-TERM CURRENT USE OF INSULIN (H): ICD-10-CM

## 2021-02-03 ENCOUNTER — AMBULATORY - HEALTHEAST (OUTPATIENT)
Dept: EDUCATION SERVICES | Facility: CLINIC | Age: 51
End: 2021-02-03

## 2021-02-03 DIAGNOSIS — E11.9 DIABETES MELLITUS WITHOUT COMPLICATION (H): ICD-10-CM

## 2021-02-03 RX ORDER — BLOOD SUGAR DIAGNOSTIC
STRIP MISCELLANEOUS
Qty: 100 STRIP | Refills: 6 | Status: SHIPPED | OUTPATIENT
Start: 2021-02-03 | End: 2022-01-06

## 2021-02-04 ENCOUNTER — COMMUNICATION - HEALTHEAST (OUTPATIENT)
Dept: FAMILY MEDICINE | Facility: CLINIC | Age: 51
End: 2021-02-04

## 2021-02-15 ENCOUNTER — COMMUNICATION - HEALTHEAST (OUTPATIENT)
Dept: FAMILY MEDICINE | Facility: CLINIC | Age: 51
End: 2021-02-15

## 2021-02-15 DIAGNOSIS — Z23 NEED FOR SHINGLES VACCINE: ICD-10-CM

## 2021-02-18 ENCOUNTER — AMBULATORY - HEALTHEAST (OUTPATIENT)
Dept: NURSING | Facility: CLINIC | Age: 51
End: 2021-02-18

## 2021-02-18 ENCOUNTER — AMBULATORY - HEALTHEAST (OUTPATIENT)
Dept: LAB | Facility: CLINIC | Age: 51
End: 2021-02-18

## 2021-02-18 DIAGNOSIS — Z23 NEED FOR SHINGLES VACCINE: ICD-10-CM

## 2021-02-18 DIAGNOSIS — E11.9 TYPE 2 DIABETES MELLITUS WITHOUT COMPLICATION, WITHOUT LONG-TERM CURRENT USE OF INSULIN (H): ICD-10-CM

## 2021-02-18 LAB
ALBUMIN SERPL-MCNC: 4.4 G/DL (ref 3.5–5)
ALP SERPL-CCNC: 51 U/L (ref 45–120)
ALT SERPL W P-5'-P-CCNC: 71 U/L (ref 0–45)
ANION GAP SERPL CALCULATED.3IONS-SCNC: 9 MMOL/L (ref 5–18)
AST SERPL W P-5'-P-CCNC: 33 U/L (ref 0–40)
BILIRUB SERPL-MCNC: 0.9 MG/DL (ref 0–1)
BUN SERPL-MCNC: 13 MG/DL (ref 8–22)
CALCIUM SERPL-MCNC: 9.6 MG/DL (ref 8.5–10.5)
CHLORIDE BLD-SCNC: 103 MMOL/L (ref 98–107)
CHOLEST SERPL-MCNC: 150 MG/DL
CO2 SERPL-SCNC: 29 MMOL/L (ref 22–31)
CREAT SERPL-MCNC: 1.1 MG/DL (ref 0.7–1.3)
FASTING STATUS PATIENT QL REPORTED: YES
GFR SERPL CREATININE-BSD FRML MDRD: >60 ML/MIN/1.73M2
GLUCOSE BLD-MCNC: 148 MG/DL (ref 70–125)
HBA1C MFR BLD: 9 %
HDLC SERPL-MCNC: 41 MG/DL
LDLC SERPL CALC-MCNC: 92 MG/DL
POTASSIUM BLD-SCNC: 4.2 MMOL/L (ref 3.5–5)
PROT SERPL-MCNC: 7.1 G/DL (ref 6–8)
SODIUM SERPL-SCNC: 141 MMOL/L (ref 136–145)
TRIGL SERPL-MCNC: 87 MG/DL

## 2021-02-19 LAB
HAV IGM SERPL QL IA: NEGATIVE
HBV CORE IGM SERPL QL IA: NEGATIVE
HBV SURFACE AG SERPL QL IA: NEGATIVE
HCV AB SERPL QL IA: NEGATIVE

## 2021-03-03 ENCOUNTER — COMMUNICATION - HEALTHEAST (OUTPATIENT)
Dept: EDUCATION SERVICES | Facility: CLINIC | Age: 51
End: 2021-03-03

## 2021-03-19 ENCOUNTER — AMBULATORY - HEALTHEAST (OUTPATIENT)
Dept: NURSING | Facility: CLINIC | Age: 51
End: 2021-03-19

## 2021-03-22 ENCOUNTER — AMBULATORY - HEALTHEAST (OUTPATIENT)
Dept: EDUCATION SERVICES | Facility: CLINIC | Age: 51
End: 2021-03-22

## 2021-03-22 DIAGNOSIS — E11.9 TYPE 2 DIABETES MELLITUS WITHOUT COMPLICATION, WITHOUT LONG-TERM CURRENT USE OF INSULIN (H): ICD-10-CM

## 2021-04-09 ENCOUNTER — AMBULATORY - HEALTHEAST (OUTPATIENT)
Dept: NURSING | Facility: CLINIC | Age: 51
End: 2021-04-09

## 2021-04-19 ENCOUNTER — HOSPITAL ENCOUNTER (OUTPATIENT)
Dept: ULTRASOUND IMAGING | Facility: CLINIC | Age: 51
Discharge: HOME | End: 2021-04-19
Attending: FAMILY MEDICINE

## 2021-04-19 DIAGNOSIS — R74.8 ELEVATED LIVER ENZYMES: ICD-10-CM

## 2021-04-23 ENCOUNTER — COMMUNICATION - HEALTHEAST (OUTPATIENT)
Dept: ADMINISTRATIVE | Facility: CLINIC | Age: 51
End: 2021-04-23

## 2021-04-23 ENCOUNTER — RECORDS - HEALTHEAST (OUTPATIENT)
Dept: ADMINISTRATIVE | Facility: OTHER | Age: 51
End: 2021-04-23

## 2021-04-23 DIAGNOSIS — E11.9 TYPE 2 DIABETES MELLITUS WITHOUT COMPLICATION, WITHOUT LONG-TERM CURRENT USE OF INSULIN (H): ICD-10-CM

## 2021-04-23 LAB — RETINOPATHY: NEGATIVE

## 2021-04-28 ENCOUNTER — RECORDS - HEALTHEAST (OUTPATIENT)
Dept: HEALTH INFORMATION MANAGEMENT | Facility: CLINIC | Age: 51
End: 2021-04-28

## 2021-04-28 ENCOUNTER — AMBULATORY - HEALTHEAST (OUTPATIENT)
Dept: EDUCATION SERVICES | Facility: CLINIC | Age: 51
End: 2021-04-28

## 2021-04-28 DIAGNOSIS — E11.9 TYPE 2 DIABETES MELLITUS WITHOUT COMPLICATION, WITHOUT LONG-TERM CURRENT USE OF INSULIN (H): ICD-10-CM

## 2021-05-17 ENCOUNTER — COMMUNICATION - HEALTHEAST (OUTPATIENT)
Dept: FAMILY MEDICINE | Facility: CLINIC | Age: 51
End: 2021-05-17

## 2021-05-17 DIAGNOSIS — E11.9 TYPE 2 DIABETES MELLITUS WITHOUT COMPLICATION, WITHOUT LONG-TERM CURRENT USE OF INSULIN (H): ICD-10-CM

## 2021-05-18 RX ORDER — METFORMIN HCL 500 MG
TABLET, EXTENDED RELEASE 24 HR ORAL
Qty: 240 TABLET | Refills: 2 | Status: SHIPPED | OUTPATIENT
Start: 2021-05-18 | End: 2021-11-08

## 2021-05-31 VITALS — WEIGHT: 208 LBS | HEIGHT: 69 IN | BODY MASS INDEX: 30.81 KG/M2

## 2021-06-01 ENCOUNTER — COMMUNICATION - HEALTHEAST (OUTPATIENT)
Dept: EDUCATION SERVICES | Facility: CLINIC | Age: 51
End: 2021-06-01

## 2021-06-03 VITALS — WEIGHT: 203.9 LBS | HEIGHT: 68 IN | BODY MASS INDEX: 30.9 KG/M2

## 2021-06-04 VITALS
HEART RATE: 83 BPM | DIASTOLIC BLOOD PRESSURE: 78 MMHG | WEIGHT: 200.3 LBS | BODY MASS INDEX: 29.67 KG/M2 | OXYGEN SATURATION: 95 % | SYSTOLIC BLOOD PRESSURE: 118 MMHG | HEIGHT: 69 IN

## 2021-06-12 NOTE — PROGRESS NOTES
Assessment:     1. Routine general medical examination at a health care facility  - Lipid Cascade  - Comprehensive Metabolic Panel  - PSA, Annual Screen (Prostatic-Specific Antigen)  - Vitamin D, Total (25-Hydroxy)  Discussed with the patient the various screening activities that we do for physical exam.  Most importantly the cardiovascular disease screening for which he is labs were being ordered.  With the report will calculate his ASCVD risk and consider if there is any need for medication.  In the meantime I did encourage him to consistently exercise and make dietary choices that is good and beneficial.  We will decide again if there is any need for treatment of the medication with the results.  We also discussed cancer screening.  He does have a family history of colon cancer in his maternal grandmother and he will discuss with his mother if she has had any polyps at which point we will consider getting slightly earlier colonoscopy.  Also discussed other cancer screening including the prostate.  His father did have a prostatectomy secondary to possibly BPH.  Noted due to high PSA.  Patient will want to have a PSA checked today as well as a rectal exam which was done.  Full counseling was done with regards to the cancer screening as it has to do with the prostate.  2. Seasonal allergic rhinitis, unspecified allergic rhinitis trigger  - loratadine (CLARITIN) 10 mg tablet; Take 1 tablet (10 mg total) by mouth daily.  Dispense: 30 tablet; Refill: 2  Currently doing well medications though he rarely takes it.  3. Snoring  - Ambulatory referral to Sleep Medicine  Because of a witnessed snoring and apnea episode I did refer him to sleep medicine for evaluation.  Will follow up with the recommendations.  4. Skin tags, multiple acquired  5 skin tags were frozen with liquid nitrogen with no immediate complication.  The wound was talked about and instructions were given.    5. Encounter for vasectomy counseling  -  Ambulatory referral to Urology  A referral was given to him for urology evaluation for vasectomy.     Plan:      1.  His full plans were noted as above.  Counseling was done for physical activity and dietary decisions.  2. Patient Counseling:  --Nutrition: Stressed importance of moderation in sodium/caffeine intake, saturated fat and cholesterol, caloric balance, sufficient intake of fresh fruits, vegetables, fiber, calcium, iron.  --Exercise: Stressed the importance of regular exercise.  This was discussed, with regards to cardiovascular disease prevention as well as prevention of diabetes mellitus, hypertension and hyperlipidemia.  --Consistent screen was also discussed.  Discussion based specifically on patient's age.  --Questions regards to patient's health maintenance were answered, and full counseling done without regards.     --Immunizations reviewed.  --Discussed benefits of screening colonoscopy.  --After hours service discussed with patient    3. Discussed the patient's BMI with him.  The BMI is above average; BMI management plan is completed  4. Follow up as needed for acute illness   5.I have had an Advance Directives discussion with the patient.  The following high BMI interventions were performed this visit: encouragement to exercise, weight monitoring and prescribed dietary intake  Subjective:       Tyler Turk is a 47 y.o. male and is here for a comprehensive physical exam.  He also had a physical exam in a long time now.  Comes in to have the physical but does have multiple other questions/concerns that he will like to be discussed.  He is active but does not really have any specific exercise schedule.  He and his wife attempts to eat right and be active and he does go for a walk once in a while.  1 of his concerns is that of snoring which is noted to be loud with associated brief pauses in his breathing which was witnessed by his wife.  He wakes up with some fatigue and tiredness and does  not feel rested all the time.  He also has some headaches.  He would like to have a sleep study.  He also has some skin tags that he wants to have removed both from his neck as well as his armpit.  He has one on his right lower extremity.  They given him some concern when the hook onto his clothes.  He is also looking to have a referral to the urologist for vasectomy.  Noted to have discussed with his wife and this is the option that they will want.  His father had a prostatectomy due to high PSA.  He is concerned about that and wants to discuss prostate cancer screening.  He will also like to have a rectal exam as well as PSA checked.  He does not have any notable urinary obstruction symptoms.  He denied having any nocturia, noted slight change in his stream not concerning.  He also has questions with regards to cancer screening which will be discussed.    Do you take any herbs or supplements that were not prescribed by a doctor? no  Are you taking aspirin daily? no    Past Medical History:   Diagnosis Date     Asthma     As a youth.     Rheumatic fever     As child     Seasonal allergies      Past Surgical History:   Procedure Laterality Date     MANDIBLE SURGERY       Social History     Social History     Marital status:      Spouse name: N/A     Number of children: N/A     Years of education: N/A     Social History Main Topics     Smoking status: Former Smoker     Smokeless tobacco: Never Used      Comment: Quit 22 years.     Alcohol use Yes      Comment: Occasionally.     Drug use: No     Sexual activity: Yes     Partners: Female     Other Topics Concern     None     Social History Narrative     None     Family History   Problem Relation Age of Onset     Breast cancer Mother      Asthma Son      Colon cancer Maternal Grandfather      No Known Allergies  Current Outpatient Prescriptions   Medication Sig Dispense Refill     loratadine (CLARITIN) 10 mg tablet Take 1 tablet (10 mg total) by mouth daily. 30  "tablet 2     No current facility-administered medications for this visit.          Review of Systems  Do you have pain that bothers you in your daily life? no    Review of Systems   Constitutional:Denied any fatigue no fevers no chills.  Has good appetite.  HEENT: Does not have any neck pain.  No difficulty swallowing, denies having any postnasal drips.No voice changes.  Snoring as stated in HPI.  Respiratory: There is no cough.  No chest wall pain.  Cardiovascular: Denied chest pain shortness of breath or palpitations.  There is no notable lower extremity swelling.    Gastrointestinal: Denies nausea vomiting.  No abdominal pain no diarrhea or constipation.  Endocrine:Has no sensitivity to cold or heat.  Denied undue thirst.   Genitourinary:Has no urinary symptoms, no nocturia.  Musculoskeletal: There is no musculoskeletal pain and swelling.    Skin: He does not have any rashes.  Both skin tags as noted.  Allergic/Immunologic: Negative.   Neurological: No Numbness  Hematological: Negative.   Psychiatric/Behavioral: No anxiety or depression symptoms.        Objective:     Vitals:    09/06/17 0803   BP: 124/84   Pulse: 80   Weight: 208 lb (94.3 kg)   Height: 5' 8.5\" (1.74 m)     Physical Exam:  General Appearance: Alert, cooperative, no distress, appears stated age and overweight.  Head: Normocephalic, without obvious abnormality, atraumatic  Eyes: PERRL, conjunctiva/corneas clear, EOM's intact  Ears: Normal TM's and external ear canals, both ears  Nose: Nares normal, septum midline,mucosa normal, no drainage  Throat: Lips, mucosa, and tongue normal; teeth and gums normal  Neck: Supple, symmetrical, trachea midline, no adenopathy;  thyroid: not enlarged, symmetric, no tenderness.  Back: Symmetric, no curvature, ROM normal, no CVA tenderness  Lungs: Clear to auscultation bilaterally, respirations unlabored  Heart: Regular rate and rhythm, S1 and S2 normal, no murmur, rub, or gallop,  Abdomen: Soft, non-tender, bowel " sounds active all four quadrants,  no masses, no organomegaly  Genitourinary: Normal circumcised male descended testicles bilaterally, normal anal sphincter no observed hemorrhoids.  He has normal sized prostate with no nodules and nontender.  Musculoskeletal: Normal range of motion. No joint swelling or deformity.   Extremities: Extremities normal, atraumatic, no cyanosis or edema  Skin: Skin color, texture, turgor normal, no rashes .  He has pedunculated skin tags 2 on the right axillary wall laterally, one on the lateral aspect of his foot right side.  He has one on the upper back and one just around the  cleft.  There is no signs of infection and all of them were skin tags.  Lymph nodes: Cervical, supraclavicular, and axillary nodes normal  Neurologic:  Alert and oriented times 3. He has normal reflexes.   Psychiatric: He has a normal mood and affect.     Cryotherapy, skin lesion  Date/Time: 2017 8:49 AM  Performed by: FADIA JOHNSON  Authorized by: FADIA JOHNSON   Consent: Verbal consent obtained.  Risks and benefits: risks, benefits and alternatives were discussed  Consent given by: patient  Patient identity confirmed: verbally with patient  Local anesthesia used: no    Anesthesia:  Local anesthesia used: no    Sedation:  Patient sedated: no  Patient tolerance: Patient tolerated the procedure well with no immediate complications  Comments: 5 skin tags were destroyed using liquid nitrogen.  Covered with Band-Aid.  Instructions given to the patient to watch out for any blister, or any abnormal redness within the next couple of days.  Follow-up as needed.

## 2021-06-13 NOTE — TELEPHONE ENCOUNTER
Patient has a lab only appointment on Thursday 12/10 and there are no orders in his chart.  Please see Dr. Raymond's notes from the physical encounter on 7/27/20.  Looks like a repeat glucose, and an A1c. He is also requesting to have his Vitamin D level checked as well.  It was low when it was last checked in 2017.  He has not been taking supplements.       Patient is also getting an alert on [employer] that he is due for the Shingles vaccine.  He stated he had shingles 5-6 years ago and is wondering if this is still needed.  He's also wondering if he were to get the vaccine if that would postpone him being able to get the covid vaccine when that comes out.  Please advise. Thanks!

## 2021-06-13 NOTE — TELEPHONE ENCOUNTER
Lab orders done.    I would have him discuss the shingles vaccine with Dr Raymond when he returns.  T  S

## 2021-06-14 NOTE — PROGRESS NOTES
"Tyler Turk is a 50 y.o. male who is being evaluated via a billable video visit.      How would you like to obtain your AVS? MyChart.  If dropped from the video visit, the video invitation should be resent by: Text to cell phone: [phone]  Will anyone else be joining your video visit? No      Video Start Time: 11:45 AM  Assessment & Plan     Tyler was seen today for discuss a1c.    Type 2 diabetes mellitus without complication, without long-term current use of insulin (H)  -     metFORMIN (GLUCOPHAGE-XR) 500 MG 24 hr tablet; Take 1 tab daily with Breakfast for 1 week then increase to 2 tabs daily with breakfast.  -     Ambulatory referral to Diabetes Education Program (CDE)  -     Glycosylated Hemoglobin A1C; Future  -     Comprehensive Metabolic Panel; Future  -     Lipid Cascade; Future  -     Hepatitis Acute Evaluation    Elevated liver enzymes  -     US Abdomen Limited; Future  I discussed with him about the management of diabetes mellitus type 2.  Also discussed about the complications.  I will go ahead and have him started on Metformin and will have him follow-up with the diabetic educator.  Encourage him to come in and 3 months for a recheck.  We also discussed his history of elevated liver enzymes.  We will have him do an ultrasound in about 3 months as well.  And when he comes into the clinic we will review all of the lab results       17  minutes spent on the date of the encounter doing chart review, history and exam, documentation and further activities as noted above         BMI:   Estimated body mass index is 30.01 kg/m  as calculated from the following:    Height as of 7/27/20: 5' 8.5\" (1.74 m).    Weight as of 7/27/20: 200 lb 4.8 oz (90.9 kg).   The following high BMI interventions were performed this visit: encouragement to exercise, weight monitoring and prescribed dietary intake      No follow-ups on file.    Paddy Raymond MD  [facility] " "NKECHI Turk is 50 y.o. and presents to clinic today for the following health issues   HPI   Diabetes Mellitus Type II, Initial Visit: Patient here for an initial evaluation of Type 2 diabetes mellitus.  Current symptoms/problems include none.He noted symptoms of Diabetes but had lab draw for his physical exam.Fasting Blood glucose was abnormal.  The patient was initially diagnosed with Type 2 diabetes mellitus based on the following criteria:  High fasting glucose and A1c.    Known diabetic complications: none  Cardiovascular risk factors: male gender and obesity (BMI >= 30 kg/m2)  Current diabetic medications include none but will start on medication..     Eye exam current (within one year): unknown  Weight trend: stable  Prior visit with dietician: no  Current diet: in general, a \"healthy\" diet    Current exercise: hiking    Current monitoring regimen: none  Home blood sugar records: none  Any episodes of hypoglycemia? no    Is He on ACE inhibitor or angiotensin II receptor blocker?   No     Review of System:  A full review of system was done and is as in the history.  Otherwise is negative.  Past Medical History:   Diagnosis Date     Asthma     As a youth.     Rheumatic fever     As child     Seasonal allergies      Past Surgical History:   Procedure Laterality Date     MANDIBLE SURGERY       Social History     Socioeconomic History     Marital status:      Spouse name: Not on file     Number of children: Not on file     Years of education: Not on file     Highest education level: Not on file   Occupational History     Not on file   Social Needs     Financial resource strain: Not on file     Food insecurity     Worry: Not on file     Inability: Not on file     Transportation needs     Medical: Not on file     Non-medical: Not on file   Tobacco Use     Smoking status: Former Smoker     Smokeless tobacco: Never Used     Tobacco comment: Quit 22 years.   Substance and Sexual " Activity     Alcohol use: Yes     Comment: Occasionally.     Drug use: No     Sexual activity: Yes     Partners: Female   Lifestyle     Physical activity     Days per week: Not on file     Minutes per session: Not on file     Stress: Not on file   Relationships     Social connections     Talks on phone: Not on file     Gets together: Not on file     Attends Protestant service: Not on file     Active member of club or organization: Not on file     Attends meetings of clubs or organizations: Not on file     Relationship status: Not on file     Intimate partner violence     Fear of current or ex partner: Not on file     Emotionally abused: Not on file     Physically abused: Not on file     Forced sexual activity: Not on file   Other Topics Concern     Not on file   Social History Narrative     Not on file     Family History   Problem Relation Age of Onset     Breast cancer Mother      Prostate cancer Father      Asthma Son      Colon cancer Maternal Grandfather      No Known Allergies  Current Outpatient Medications   Medication Sig Dispense Refill     metFORMIN (GLUCOPHAGE-XR) 500 MG 24 hr tablet Take 1 tab daily with Breakfast for 1 week then increase to 2 tabs daily with breakfast. 180 tablet 0     No current facility-administered medications for this visit.             Objective    Vitals - Patient Reported  Weight (Patient Reported): 205 lb (93 kg)    Physical Exam  He was alert, oriented.  Good eye contact.  Does have a normal mentation.  There is no skin rash on exposed skin of the face.  His mood and affect was normal.          Video-Visit Details    Type of service:  Video Visit     Video End Time (time video stopped): 12:05 PM  Originating Location (pt. Location): Home    Distant Location (provider location):  [facility]     Platform used for Video Visit: [employer]

## 2021-06-15 NOTE — TELEPHONE ENCOUNTER
Pt is on the [facility] injection schedule this Thursday for 2nd dose Shingrix, first dose- (12/10/2020) Please place future orders.     FREIDA Davalos

## 2021-06-15 NOTE — TELEPHONE ENCOUNTER
Left a message to schedule a lab only appointment for his diabetes. The orders are in chart. Please assist patient with appointment.

## 2021-06-15 NOTE — TELEPHONE ENCOUNTER
Please call pt to r/s their diabetes education f/u since I will not be available at the current scheduled day/time.      Thanks   Autumn Nickerson RDN, LD  Diabetes Care and Education

## 2021-06-15 NOTE — TELEPHONE ENCOUNTER
Date: 3/22/2021 Status: [facility]   Time: 2:00 PM Length: 60   Visit Type: TELEPHONE VISIT RETURN [[id number]] Copay: $0.00   Provider: Autumn Nickerson, Diabetes Ed

## 2021-06-17 NOTE — TELEPHONE ENCOUNTER
RN cannot approve Refill Request    RN can NOT refill this medication PCP messaged that patient is overdue for Labs. Last office visit: Visit date not found Last Physical: 7/27/2020 Last MTM visit: Visit date not found Last visit same specialty: Visit date not found.  Next visit within 3 mo: Visit date not found  Next physical within 3 mo: Visit date not found      Cyndee Mathias, [facility] Triage/Med Refill 5/18/2021    Requested Prescriptions   Pending Prescriptions Disp Refills     metFORMIN (GLUCOPHAGE-XR) 500 MG 24 hr tablet [Pharmacy Med Name: METFORMIN HCL  MG TABLET] 120 tablet 1     Sig: TAKE 4 TABLETS DAILY (2000 MG TOTAL).       Metformin Refill Protocol Failed - 5/17/2021  1:30 PM        Failed - Microalbumin in last year      No results found for: MICROALBUR               Passed - Blood pressure in last 12 months     BP Readings from Last 1 Encounters:   07/27/20 118/78             Passed - LFT or AST or ALT in last 12 months     Albumin   Date Value Ref Range Status   02/18/2021 4.4 3.5 - 5.0 g/dL Final     Bilirubin, Total   Date Value Ref Range Status   02/18/2021 0.9 0.0 - 1.0 mg/dL Final     Bilirubin, Direct   Date Value Ref Range Status   12/11/2017 0.2 <=0.5 mg/dL Final     Alkaline Phosphatase   Date Value Ref Range Status   02/18/2021 51 45 - 120 U/L Final     AST   Date Value Ref Range Status   02/18/2021 33 0 - 40 U/L Final     ALT   Date Value Ref Range Status   02/18/2021 71 (H) 0 - 45 U/L Final     Protein, Total   Date Value Ref Range Status   02/18/2021 7.1 6.0 - 8.0 g/dL Final                Passed - GFR or Serum Creatinine in last 6 months     GFR MDRD Non Af Amer   Date Value Ref Range Status   02/18/2021 >60 >60 mL/min/1.73m2 Final     GFR MDRD Af Amer   Date Value Ref Range Status   02/18/2021 >60 >60 mL/min/1.73m2 Final             Passed - Visit with PCP or prescribing provider visit in last 6 months or next 3 months     Last office visit with prescriber/PCP:  Visit date not found OR same dept: Visit date not found OR same specialty: Visit date not found Last physical: Visit date not found Last MTM visit: Visit date not found         Next appt within 3 mo: Visit date not found  Next physical within 3 mo: Visit date not found  Prescriber OR PCP: Paddy Raymond MD  Last diagnosis associated with med order: 1. Type 2 diabetes mellitus without complication, without long-term current use of insulin (H)  - metFORMIN (GLUCOPHAGE-XR) 500 MG 24 hr tablet [Pharmacy Med Name: METFORMIN HCL  MG TABLET]; Take 4 tablets daily (2000 mg total).  Dispense: 120 tablet; Refill: 1     If protocol passes may refill for 12 months if within 3 months of last provider visit (or a total of 15 months).           Passed - A1C in last 6 months     Hemoglobin A1c   Date Value Ref Range Status   02/18/2021 9.0 (H) <=5.6 % Final

## 2021-06-19 NOTE — LETTER
Letter by Mark Oscar at      Author: Mark Oscar Service: -- Author Type: --    Filed:  Encounter Date: 7/29/2019 Status: (Other)          July 29, 2019      Tyler Turk  [address] [address]      Dear Tyler Turk,    We have processed your request for proxy access to [employer]. If you did not make a request to randall proxy access to an individual, please contact us immediately at [phone].    Through proxy access, your family member or other individual you approve, will be provided secure online access to information regarding your health. Through [employer], they will be able to review instructions from your health care provider, send a secure message to your provider, view test results, manage your appointments and more.    Again, thank you for registering for [employer]. Our team looks forward to partnering with you in managing your medical care and supporting healthy behaviors.     Thank you for choosing [employer]    Sincerely,    [employer]    If you have any further questions, please contact our [employer] Support Team by phone [phone] or email, [email or web address].

## 2021-06-20 ENCOUNTER — HEALTH MAINTENANCE LETTER (OUTPATIENT)
Age: 51
End: 2021-06-20

## 2021-06-25 NOTE — TELEPHONE ENCOUNTER
Tried calling the patient. Left them a voicemail and also the call back number. Please contact patient to reschedule.    Thanks  Autumn Nickerson RDN, YUE  Diabetes Care and Education

## 2021-06-27 NOTE — PROGRESS NOTES
Progress Notes by Paddy Raymond MD at 6/24/2019  1:40 PM     Author: Paddy Raymond MD Service: -- Author Type: Physician    Filed: 6/24/2019  6:10 PM Encounter Date: 6/24/2019 Status: Signed    : Paddy Raymond MD (Physician)       MALE PREVENTATIVE EXAM    Assessment and Plan:       1. Routine general medical examination at a health care facility  - Lipid Cascade  - Comprehensive Metabolic Panel  - PSA, Annual Screen (Prostatic-Specific Antigen)  - JIC LAV  Reviewed with him the previous labs from the previous physical exam that he had.  He had noted a huge improvement in his physical activity and exercise since he has been training for a  camping.  He is going for the high adventure  camp.  He has lost some weight and I am looking forward to reviewing his current labs.  He did have his a  camping form filled out.  2. Tetanus-diphtheria (Td) vaccination  - Td, Preservative Free (green label)    3. Need for hepatitis A vaccination  - Hepatitis A adult 2 dose IM (19 yr & older)  He did have his immunization updated for the upcoming trip.  We also did discuss the high adventure part of his [employer] camping.      Next follow up:  No follow-ups on file.    Immunization Review  Adult Imm Review: Due today, orders placed  BMI: 31.00  He does not smoke.    I discussed the following with the patient:   Adult Healthy Living: Importance of regular exercise  Healthy nutrition  Weight loss referral options  Getting adequate sleep    I have had an Advance Directives discussion with the patient.    Subjective:   Chief Complaint: Tyler Turk is an 48 y.o. male here for a preventative health visit.     HPI: Comes in today for his routine physical exam.  His last physical was about 2 years ago.  He noted no changes in his health history and has a remain active and exercising.  He is back of [place] and will be going for a  camping.  He brings in a form to be  filled for that purpose.  He is going for a high adventure camping.  He notes that he has been training for the high adventure camping because they will be going to a place that is about 1650 [place] above [place] and possibly up to 21,000 feet.  He is not having that before but noted that there is medical help in those comes.  He does not really have any major medical problems, medications were reviewed.    Healthy Habits  Are you taking a daily aspirin? No  Do you typically exercising at least 40 min, 3-4 times per week?  Yes  Do you usually eat at least 4 servings of fruit and vegetables a day, include whole grains and fiber and avoid regularly eating high fat foods? NO  Have you had an eye exam in the past two years? NO , exam scheduled for 8/2019  Do you see a dentist twice per year? Yes  Do you have any concerns regarding sleep? YES, previous referral but never scheduled.     Safety Screen  If you own firearms, are they secured in a locked gun cabinet or with trigger locks? Yes  Do you feel you are safe where you are living?: Yes (6/24/2019  2:07 PM)  Do you feel you are safe in your relationship(s)?: Yes (6/24/2019  2:07 PM)      Review of Systems:   Constitutional:Denied any fatigue no fevers no chills.  Has good appetite.  HEENT: Does not have any neck pain.  No difficulty swallowing denies having any postnasal drips.    Respiratory: There is no cough.  No chest wall pain.  Cardiovascular: Denied chest pain shortness of breath or palpitations.  There is no notable lower extremity swelling.    Gastrointestinal: Denies nausea vomiting.  No abdominal pain no diarrhea or constipation.  Endocrine:Has no sensitivity to cold or heat.  Denied undue thirst.   Genitourinary:Has no urinary symptoms, no nocturia.  Musculoskeletal: There is no musculoskeletal pain and swelling.    Skin: He does not have any rashes.   Allergic/Immunologic: Negative.   Neurological: No Numbness  Hematological: Negative.  "  Psychiatric/Behavioral: No anxiety or depression symptoms.   Please see above.  The rest of the review of systems are negative for all systems.     Cancer Screening     Patient has no health maintenance due at this time          Patient Care Team:  Paddy Raymond MD as PCP - General (Family Medicine)        History     Reviewed By Date/Time Sections Reviewed    Salome Celeste LPN 6/24/2019  2:07 PM Tobacco            Objective:   Vital Signs:   Visit Vitals  /84 (Patient Site: Left Arm, Patient Position: Sitting, Cuff Size: Adult Large)   Pulse 72   Ht 5' 8\" (1.727 m)   Wt 203 lb 14.4 oz (92.5 kg)   BMI 31.00 kg/m       Physical Exam:  General Appearance: Alert, cooperative, no distress, appears stated age  Head: Normocephalic, without obvious abnormality, atraumatic  Eyes: PERRL, conjunctiva/corneas clear, EOM's intact  Ears: Normal TM's and external ear canals, both ears  Nose: Nares normal, septum midline,mucosa normal, no drainage  Throat: Lips, mucosa, and tongue normal; teeth and gums normal  Neck: Supple, symmetrical, trachea midline, no adenopathy;  thyroid: not enlarged, symmetric, no tenderness.  Back: Symmetric, no curvature, ROM normal, no CVA tenderness  Lungs: Clear to auscultation bilaterally, respirations unlabored  Heart: Regular rate and rhythm, S1 and S2 normal, no murmur, rub, or gallop,  Abdomen: Soft, non-tender, bowel sounds active all four quadrants,  no masses, no organomegaly  Musculoskeletal: Normal range of motion. No joint swelling or deformity.   Extremities: Extremities normal, atraumatic, no cyanosis or edema  Skin: Skin color, texture, turgor normal, no rashes or lesions  Lymph nodes: Cervical, supraclavicular, and axillary nodes normal  Neurologic: He is alert. He has normal reflexes.   Psychiatric: He has a normal mood and affect.            Medication List           Accurate as of 6/24/19  6:10 PM. If you have any questions, ask your nurse or doctor. "               CONTINUE taking these medications    ergocalciferol 50,000 unit capsule  Also known as:  ERGOCALCIFEROL  INSTRUCTIONS:  TAKE 1 CAPSULE (50,000 UNITS TOTAL) BY MOUTH ONCE A WEEK FOR 12 DOSES.               Additional Screenings Completed Today:

## 2021-06-29 NOTE — PROGRESS NOTES
Progress Notes by Paddy Raymond MD at 7/27/2020  8:00 AM     Author: Paddy Raymond MD Service: -- Author Type: Physician    Filed: 7/27/2020  5:45 PM Encounter Date: 7/27/2020 Status: Signed    : Paddy Raymond MD (Physician)       MALE PREVENTATIVE EXAM    Assessment and Plan:       1. Routine general medical examination at a health care facility  - Lipid Cascade  - Comprehensive Metabolic Panel  - PSA, Annual Screen (Prostatic-Specific Antigen)    2. Screen for colon cancer  - Ambulatory referral for Colonoscopy    3. Snores    4. Seasonal allergies  Did discuss regarding his snoring which appears to be associated with his allergy as well as nasal congestion.  We will have him try to use Flonase as well as the decongestants that he has already.  He will also talk with his wife to determine if he has apneic episodes.  If he does he will let us know.  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      Next follow up:  No follow-ups on file.    Immunization Review  Adult Imm Review: No immunizations due today    I discussed the following with the patient:   Adult Healthy Living: Importance of regular exercise  Healthy nutrition  Weight loss referral options  Getting adequate sleep      Subjective:   Chief Complaint: Tyler Turk is an 50 y.o. male here for a preventative health visit.     HPI:  In for his routine physical exam. 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 No changes from the last physical exam.   Exercises and watches his diet.  He is a going to  camp and needed to have form filled out for that.  He otherwise feels well.    Healthy Habits  Are you taking a daily aspirin? No  Do you typically exercising at least 40 min, 3-4 times per week?  Yes  Do you usually eat at least 4 servings of fruit and vegetables a day, include whole grains and fiber and avoid regularly eating high fat foods? Yes  Have you had an eye exam in the past two years? Yes  Do you  "see a dentist twice per year? Yes  Do you have any concerns regarding sleep? YES: snores    Safety Screen  If you own firearms, are they secured in a locked gun cabinet or with trigger locks? Yes  No data recorded    Review of Systems:    Constitutional:Denied any fatigue no fevers no chills.  Has good appetite.  HEENT: Does not have any neck pain.  No difficulty swallowing denies having any postnasal drips.  Snores loudly and noted wife is aware.  Respiratory: There is no cough.  No chest wall pain.  Cardiovascular: Denied chest pain shortness of breath or palpitations.  There is no notable lower extremity swelling.    Gastrointestinal: Denies nausea vomiting.  No abdominal pain no diarrhea or constipation.  Endocrine:Has no sensitivity to cold or heat.  Denied undue thirst.   Genitourinary:Has no urinary symptoms, no nocturia.  Musculoskeletal: There is no musculoskeletal pain and swelling.    Skin: He does not have any rashes.   Allergic/Immunologic: Negative.   Neurological: No Numbness  Hematological: Negative.   Psychiatric/Behavioral: No anxiety or depression symptoms.    Please see above.  The rest of the review of systems are negative for all systems.     Cancer Screening     Patient has no health maintenance due at this time          Patient Care Team:  Paddy Raymond MD as PCP - General (Family Medicine)  Paddy Raymond MD as Assigned PCP        History     Not marked as reviewed during this visit.            Objective:   Vital Signs:   Vitals:    07/27/20 0759   BP: 118/78   Patient Site: Left Arm   Patient Position: Sitting   Cuff Size: Adult Regular   Pulse: 83   SpO2: 95%   Weight: 200 lb 4.8 oz (90.9 kg)   Height: 5' 8.5\" (1.74 m)     Physical Exam:  General Appearance: Alert, cooperative, no distress, appears stated age  Head: Normocephalic, without obvious abnormality, atraumatic  Eyes: PERRL, conjunctiva/corneas clear, EOM's intact  Ears: Normal TM's and external ear " canals, both ears  Nose: Nares normal, septum midline,mucosa normal, no drainage  Throat: Lips, mucosa, and tongue normal; teeth and gums normal  Neck: Supple, symmetrical, trachea midline, no adenopathy;  thyroid: not enlarged, symmetric, no tenderness.  Back: Symmetric, no curvature, ROM normal, no CVA tenderness  Lungs: Clear to auscultation bilaterally, respirations unlabored  Heart: Regular rate and rhythm, S1 and S2 normal, no murmur, rub, or gallop,  Abdomen: Soft, non-tender, bowel sounds active all four quadrants,  no masses, no organomegaly  Musculoskeletal: Normal range of motion. No joint swelling or deformity.   Extremities: Extremities normal, atraumatic, no cyanosis or edema  Skin: Skin color, texture, turgor normal, no rashes or lesions  Lymph nodes: Cervical, supraclavicular, and axillary nodes normal  Neurologic: He is alert. He has normal reflexes.   Psychiatric: He has a normal mood and affect.            Medication List          Accurate as of July 27, 2020  8:40 AM. If you have any questions, ask your nurse or doctor.            STOP taking these medications    ergocalciferol 1,250 mcg (50,000 unit) capsule  Also known as:  ERGOCALCIFEROL  Stopped by:  Paddy Raymond MD            Additional Screenings Completed Today:

## 2021-06-30 NOTE — PROGRESS NOTES
Progress Notes by Autumn Nickerson, Diabetes Ed at 2/3/2021 10:00 AM     Author: Autumn Nickerson, Diabetes Ed Service: -- Author Type: Diabetes Ed    Filed: 2/3/2021  7:11 PM Encounter Date: 2/3/2021 Status: Attested    : Autumn Nickerson, Diabetes Ed (Diabetes Ed) Cosigner: Cem Banks MD at 2/5/2021  7:50 AM    Attestation signed by Cem Banks MD at 2/5/2021  7:50 AM                     Type of Service: Telephone Visit    Patient would like to receive their AVS by --    Assessment:   Initial visit  New dx. Fam hx: none  A1C:8.5 (12/10)  Current wt:201 lb, has been working on losing wt  Denies any s/s of hyperglycemia  Regular intake of sugary beverages: soda, sports drinks   Had laser surgery in both his eyes ~ 10 years ago and feels that has made his vision worse   No other concerns today    1000 mg metformin XR in the am daily - reports no SE    SMBG:  Rx for meter + testing supplies was sent in  Reviewed meter and testing basics     Has been trying to be more mindful of his intake.Eats 3-4 times/d  Food recall:  BF: eggs, 2 toasts, water  L: 2 hot dogs, beef, condiments, coleslaw  D: roast beef, mashed potatoes, green beans   S: banana    Beverages: water, 2 cans of pop daily (was told by a nutritionist to stop diet pop), sports drink (body armor - lots of sugar)    Activity: walks/hikes when he can, lifts. Also has a TM     Education/Discussion: Reviewed diabetes basics, AIC and BS goals, sx and tx of hypo and hyperglycemia, complications of uncontrolled diabetes, general activity and diet guidelines, CHO foods, concept of budgeting and balancing, planning ahead for healthy meals, examples of quick and healthy meals/snacks, plate method for portion control, meter and testing basics: pt expressed understanding.    Discussed optimal lifestyle interventions to help control blood sugars, including healthy diet and physical activity. Advised to eat a diet low in carbohydrates with increased vegetables, healthy  fat, and protein.    Encouraged to not wait longer than 4-6 hours to eat meals or snacks and discussed how eating regularly helps avoid lows and keeps the liver from putting out glucose on its own, making the blood sugar higher.    Plan:   Continue with the current therapy and DM management plan - regular monitoring of BG, daily physical activity as able/safe, eating healthy & watching portions of carbs  Pt is agreeable to quit drinking pop and sports drinks.  Goals as below.  Will also send out some basic patient education materials in the mail.  Pt will be due for a lab draw/ A1C  3/10 or after - there are standing orders from 1/19  To follow up in ~ 6 weeks (or sooner if concerns) - phone number provided      Subjective and Objective:      Tyler Turk is referred by Paddy for Diabetes Education.     Lab Results   Component Value Date    HGBA1C 8.5 (H) 12/10/2020     Goals:  Test BS 1x/d and keep a BS log for review  Take meds as prescribed   Eat regular meals and NOT skip  Activity as tolerated/able  STOP drinking pop  Adequate hydration         Education:     Monitoring   Meter (per above goals): Assessed and Discussed  Monitoring: Assessed and Discussed  BG goals: Assessed and Discussed    Nutrition Management  Nutrition Management: Assessed, Discussed and Literature provided  Weight: Assessed and Discussed  Portions/Balance: Assessed and Discussed  Carb ID/Count: Assessed and Discussed  Label Reading: Assessed and Discussed  Heart Healthy Fats: Not addressed  Menu Planning: Not addressed  Dining Out: Assessed  Physical Activity: Assessed and Discussed  Medications: Assessed and Discussed  Orals: Assessed and Discussed    Diabetes Disease Process: Assessed and Discussed    Acute Complications: Prevent, Detect, Treat:  Hypoglycemia: Assessed and Discussed  Hyperglycemia: Assessed and Discussed  Sick Days: Assessed  Driving: Not addressed    Chronic Complications  Foot Care:Assessed  Skin Care:  Assessed  Eye: Assessed  ABC: Assessed  Teeth:Assessed  Goal Setting and Problem Solving: Assessed and Discussed  Barriers: Assessed and Discussed  Psychosocial Adjustments: Assessed      Time spent with the patient: 60 minutes for diabetes education and counseling.   Previous Education: no  Visit Type:YESSY Nickerson  2/3/2021

## 2021-06-30 NOTE — PROGRESS NOTES
Progress Notes by Autumn Nickerson, Diabetes Ed at 4/28/2021  3:00 PM     Author: Autumn Nickerson, Diabetes Ed Service: -- Author Type: Diabetes Ed    Filed: 4/28/2021  4:42 PM Encounter Date: 4/28/2021 Status: Attested    : Autumn Nickerson, Diabetes Ed (Diabetes Ed) Cosigner: Cem Banks MD at 4/29/2021  7:46 AM    Attestation signed by Cem Banks MD at 4/29/2021  7:46 AM    f                Type of Service: Telephone Visit    Patient would like to receive their AVS by AVS Preference: MyChart.     Assessment:   Follow up visit to assess glycemic control  A1C:9.0 (2/18) > 8.5 (12/10) - pt is due for A1C draw  Has been working on losing wt.Current wt:195 lbs, down 5 lbs in 2 -3 months  Pt has also been focusing on lifestyle modification and SMBG data indicate much improved glycemic control, though not optimal  Denies any s/s of hyperglycemia  Recent eye exam was unremarkable   No other concerns today     Taking 2000 mg metformin XR daily -  no SE     SMBG:  Tests 1- 2x/d. Continues to use a phone nathanael to track his BG  BG readings starting 4/14:  FBS: 153, 130, 139, 135, 159, 152, 153, 141, 120, 125, 123, 122, 135,153, 170 (ate out the previous night)  BG throughout the day: 98, 124, 117, 133  Lows: none     Continues with mindful intake. Consumes 3 meals/d    Beverages: water. Limits pop/sports drinks to 1x/wk.      Activity - has been more active than before:strength exercises/ also has a TM at home    Plan:   SMBG data indicate much improved glycemic control, though not optimal. Pt has been focusing on wt loss and lifestyle modification.  Recommend we continue with the current therapy and DM management plan - regular monitoring of BG, daily physical activity as able/safe, eating healthy & watching portions of carbs  Goals as below.  Pt will be due A1C draw 5/18 or after - he will call to schedule   To follow up in ~ 6 weeks (or sooner if concerns)     Subjective and Objective:      Tyler Turk is referred  by Dr. Thomason for Diabetes Education.     Lab Results   Component Value Date    HGBA1C 9.0 (H) 02/18/2021     Goals:  Test BS 1x/d and keep a BS log for review - doing  Take meds as prescribed - not doing  Eat regular meals and NOT skip - doing   Activity as tolerated/able - not doing   STOP drinking pop - doing some   Adequate hydration - doing  Will take 15 mins daily to exercise x 6 weeks - doing some     Education:     Monitoring   Meter (per above goals): Assessed and Discussed  Monitoring: Assessed and Discussed  BG goals: Assessed and Discussed    Nutrition Management  Nutrition Management: Assessed and Discussed  Weight: Assessed and Discussed  Portions/Balance: Assessed and Discussed  Carb ID/Count: Assessed and Discussed  Label Reading: Assessed  Heart Healthy Fats: Assessed and Discussed  Menu Planning: Assessed  Dining Out: Assessed  Physical Activity: Assessed and Discussed  Medications: Assessed and Discussed  Orals: Assessed and Discussed  Injected Medications: Assessed     Diabetes Disease Process: Assessed and Discussed    Acute Complications: Prevent, Detect, Treat:  Hypoglycemia: Assessed  Hyperglycemia: Assessed and Discussed  Sick Days: Assessed  Driving: Assessed    Chronic Complications  Foot Care:Assessed  Skin Care: Assessed  Eye: Assessed  ABC: Assessed  Teeth:Assessed  Goal Setting and Problem Solving: Assessed and Discussed  Barriers: Assessed and Discussed  Psychosocial Adjustments: Assessed      Time spent with the patient: 35 minutes for diabetes education and counseling.   Previous Education: yes  Visit Type:YESSY Nickerson  4/28/2021

## 2021-06-30 NOTE — PROGRESS NOTES
Progress Notes by Autumn Nickerson, Diabetes Ed at 3/22/2021  2:00 PM     Author: Autumn Nickerson, Diabetes Ed Service: -- Author Type: Diabetes Ed    Filed: 3/22/2021  3:06 PM Encounter Date: 3/22/2021 Status: Attested    : Autumn Nickerson, Diabetes Ed (Diabetes Ed) Cosigner: Paddy Raymond MD at 3/22/2021  4:40 PM    Attestation signed by Paddy Raymond MD at 3/22/2021  4:40 PM    Noted.                Type of Service: Telephone Visit    Patient would like to receive their AVS by AVS Preference: MyChart.     Assessment:   Follow up visit for DSME.  Uncontrolled A1C:9.0 (2/18) > 8.5 (12/10)  Has been working on losing wt - current wt:196 lbs, down 5 lbs in 1 month  SMBG data indicate much improved glycemic control, though not optimal  Pt has also been focusing on lifestyle modification and making significant changes in his diet/intake - congratulated pt  Denies any s/s of hyperglycemia  Has just started a new job and been very busy  Is going to go for his eye exam soon   Reports feeling a lot better overall  No other concerns today     Taking 1000 mg metformin XR daily -  no SE  We reviewed current rx of metformin of 2000 mg daily (increased on 2/18) - pt will start taking 2000 mg daily, today      SMBG:  Tests 1x/d. Has been using a phone nathanael to track his BG  BG readings starting 3/22 - 3/9  FBS: 147,100, 125, 106, 120, 118, 148 (had pop), 130, 132, 128, 153, 124, 127, 131  Lows: none     Trying to be more mindful of his intake. Consumes 3 meals/d  Food recall:   BF: 2 pieces of toast , 2 pieces of heck, 3 whole eggs   L: stir bean of meats and veg, 1/2 cup of rice   D: chicken with peanut gurwinder sauce with peppers  Denies any sweets; does have a bite sized candy/chocolate every once in a while.     Beverages: water. Limits pop/sports drinks to 1x/wk.     Activity: has not been as active due to recent the new job. Has a TM at home and will start using it.  We discussed ways to build in more  physical activity as able/safe and how even a few mins would be better than zero minutes.      Education/Discussion: Reviewed diabetes basics, AIC and BS goals, healthy plate method, CHO allowance for meals/snacks, reading labels      Plan:   SMBG data indicate much improved glycemic control, though not optimal. Pt has been making significant changes in his diet/intake - commended him for that.  Pt will take 2000 mg metformin XR daily starting today.   Updated rx for metformin sent in  Recommend we continue with the current therapy and DM management plan - regular monitoring of BG, daily physical activity as able/safe, eating healthy & watching portions of carbs  Future - discuss low - fat, high - fiber diet for cholesterol at the f/u visit  Goals as below.  To follow up in ~ 6 weeks (or sooner if concerns) - phone number provided       Subjective and Objective:      Tyler Turk is referred by Dr. Thomason for Diabetes Education.     Lab Results   Component Value Date    HGBA1C 9.0 (H) 02/18/2021       Goals:  Test BS 1x/d and keep a BS log for review - doing  Take meds as prescribed - not doing  Eat regular meals and NOT skip - doing   Activity as tolerated/able - not doing   STOP drinking pop - doing some   Adequate hydration - doing  Will take 15 mins daily to exercise x 6 weeks    Education:     Monitoring   Meter (per above goals): Assessed and Discussed  Monitoring: Assessed and Discussed  BG goals: Assessed and Discussed    Nutrition Management  Nutrition Management: Assessed and Discussed  Weight: Assessed and Discussed  Portions/Balance: Assessed and Discussed  Carb ID/Count: Assessed and Discussed  Label Reading: Assessed and Discussed  Heart Healthy Fats: Not addressed  Menu Planning: Assessed  Dining Out: Assessed  Physical Activity: Assessed and Discussed  Medications: Assessed and Discussed  Orals: Assessed and Discussed  Injected Medications: Assessed     Diabetes Disease Process:  Assessed    Acute Complications: Prevent, Detect, Treat:  Hypoglycemia: Assessed  Hyperglycemia: Assessed and Discussed  Sick Days: Assessed  Driving: Assessed    Chronic Complications  Foot Care:Assessed  Skin Care: Assessed  Eye: Assessed  ABC: Assessed  Teeth:Assessed  Goal Setting and Problem Solving: Assessed  Barriers: Assessed and Discussed  Psychosocial Adjustments: Assessed      Time spent with the patient: 60 minutes for diabetes education and counseling.   Previous Education: yes  Visit Type:YESSY Nickerson  3/22/2021

## 2021-07-28 ENCOUNTER — OFFICE VISIT (OUTPATIENT)
Dept: FAMILY MEDICINE | Facility: CLINIC | Age: 51
End: 2021-07-28
Payer: COMMERCIAL

## 2021-07-28 VITALS
BODY MASS INDEX: 29.51 KG/M2 | SYSTOLIC BLOOD PRESSURE: 100 MMHG | OXYGEN SATURATION: 98 % | HEART RATE: 86 BPM | WEIGHT: 199.2 LBS | HEIGHT: 69 IN | DIASTOLIC BLOOD PRESSURE: 76 MMHG

## 2021-07-28 DIAGNOSIS — Z00.00 ROUTINE GENERAL MEDICAL EXAMINATION AT A HEALTH CARE FACILITY: Primary | ICD-10-CM

## 2021-07-28 DIAGNOSIS — E11.9 TYPE 2 DIABETES MELLITUS WITHOUT COMPLICATION, WITHOUT LONG-TERM CURRENT USE OF INSULIN (H): ICD-10-CM

## 2021-07-28 LAB
CREAT UR-MCNC: 279 MG/DL
HBA1C MFR BLD: 5.9 % (ref 0–5.6)
MICROALBUMIN UR-MCNC: 1.43 MG/DL (ref 0–1.99)
MICROALBUMIN/CREAT UR: 5.1 MG/G CR

## 2021-07-28 PROCEDURE — 36415 COLL VENOUS BLD VENIPUNCTURE: CPT | Performed by: FAMILY MEDICINE

## 2021-07-28 PROCEDURE — 83036 HEMOGLOBIN GLYCOSYLATED A1C: CPT | Performed by: FAMILY MEDICINE

## 2021-07-28 PROCEDURE — 99396 PREV VISIT EST AGE 40-64: CPT | Performed by: FAMILY MEDICINE

## 2021-07-28 PROCEDURE — 82043 UR ALBUMIN QUANTITATIVE: CPT | Performed by: FAMILY MEDICINE

## 2021-07-28 ASSESSMENT — MIFFLIN-ST. JEOR: SCORE: 1736.01

## 2021-07-28 NOTE — PROGRESS NOTES
SUBJECTIVE:   CC: Tyler Turk is an 51 year old male who presents for preventative health visit.   He is here for physical exam.  And noted no major changes in his health history.    Patient has been advised of split billing requirements and indicates understanding: Yes  Healthy Habits:     Getting at least 3 servings of Calcium per day:  Yes    Bi-annual eye exam:  Yes    Dental care twice a year:  Yes    Sleep apnea or symptoms of sleep apnea:  Daytime drowsiness, Excessive snoring and Sleep apnea    Diet:  Diabetic and Carbohydrate counting    Frequency of exercise:  4-5 days/week    Duration of exercise:  Less than 15 minutes    Taking medications regularly:  Yes    Medication side effects:  None    PHQ-2 Total Score: 0    Additional concerns today:  No      He does have a history of recently diagnosed diabetes mellitus.  Is currently taking Metformin 2000 mg daily and has been seeing the diabetic educator.  I noted that his blood glucose has been in the ranges of 110-115.  Has also gotten 125 and slightly higher number intermittently.  Noted no problems with that.  And noted that he is doing well with medication take his medications as well as exercises.  We will currently not really exercising at this time.  He noted no major concerns today.    Today's PHQ-2 Score:   PHQ-2 ( 1999 Pf[employer]) 7/25/2021   Q1: Little interest or pleasure in doing things 0   Q2: Feeling down, depressed or hopeless 0   PHQ-2 Score 0   Q1: Little interest or pleasure in doing things Not at all   Q2: Feeling down, depressed or hopeless Not at all   PHQ-2 Score 0       Abuse: Current or Past(Physical, Sexual or Emotional)- No  Do you feel safe in your environment? Yes        Social History     Tobacco Use     Smoking status: Never Smoker     Smokeless tobacco: Never Used   Substance Use Topics     Alcohol use: Not on file     If you drink alcohol do you typically have >3 drinks per day or >7 drinks per week? No    Alcohol Use  7/25/2021   Prescreen: >3 drinks/day or >7 drinks/week? No   No flowsheet data found.    Last PSA:   Prostate Specific Antigen Screen   Date Value Ref Range Status   07/27/2020 0.7 0.00 - 3.50 ng/mL Final       Reviewed orders with patient. Reviewed health maintenance and updated orders accordingly - Yes      Reviewed and updated as needed this visit by clinical staff   Allergies  Meds              Reviewed and updated as needed this visit by Provider                Family History   Problem Relation Age of Onset     Sleep Apnea Father      Sleep Apnea Sister      Breast Cancer Mother      Prostate Cancer Father      Asthma Son      Colon Cancer Maternal Grandfather       Social History     Socioeconomic History     Marital status:      Spouse name: Not on file     Number of children: Not on file     Years of education: Not on file     Highest education level: Not on file   Occupational History     Not on file   Tobacco Use     Smoking status: Never Smoker     Smokeless tobacco: Never Used   Substance and Sexual Activity     Alcohol use: Not on file     Drug use: Not on file     Sexual activity: Not on file   Other Topics Concern     Not on file   Social History Narrative     Not on file     Social Determinants of Health     Financial Resource Strain:      Difficulty of Paying Living Expenses:    Food Insecurity:      Worried About Running Out of Food in the Last Year:      Ran Out of Food in the Last Year:    Transportation Needs:      Lack of Transportation (Medical):      Lack of Transportation (Non-Medical):    Physical Activity:      Days of Exercise per Week:      Minutes of Exercise per Session:    Stress:      Feeling of Stress :    Social Connections:      Frequency of Communication with Friends and Family:      Frequency of Social Gatherings with Friends and Family:      Attends Gnosticist Services:      Active Member of Clubs or Organizations:      Attends Club or Organization Meetings:       Marital Status:    Intimate Partner Violence:      Fear of Current or Ex-Partner:      Emotionally Abused:      Physically Abused:      Sexually Abused:       Past Surgical History:   Procedure Laterality Date     MANDIBLE SURGERY        Past Medical History:   Diagnosis Date     Asthma     As a youth.     Rheumatic fever     As child     Seasonal allergies           Review of Systems  CONSTITUTIONAL: NEGATIVE for fever, chills, change in weight  INTEGUMENTARY/SKIN: NEGATIVE for worrisome rashes, moles or lesions  EYES: NEGATIVE for vision changes or irritation  ENT: NEGATIVE for ear, mouth and throat problems  RESP: NEGATIVE for significant cough or SOB  CV: NEGATIVE for chest pain, palpitations or peripheral edema  GI: NEGATIVE for nausea, abdominal pain, heartburn, or change in bowel habits   male: negative for dysuria, hematuria, decreased urinary stream, erectile dysfunction, urethral discharge  MUSCULOSKELETAL: NEGATIVE for significant arthralgias or myalgia  NEURO: NEGATIVE for weakness, dizziness or paresthesias  PSYCHIATRIC: NEGATIVE for changes in mood or affect    OBJECTIVE:   There were no vitals taken for this visit.    Physical Exam  GENERAL: healthy, alert and no distress  EYES: Eyes grossly normal to inspection, PERRL and conjunctivae and sclerae normal  HENT: ear canals and TM's normal, nose and mouth without ulcers or lesions  NECK: no adenopathy, no asymmetry, masses, or scars and thyroid normal to palpation  RESP: lungs clear to auscultation - no rales, rhonchi or wheezes  CV: regular rate and rhythm, normal S1 S2, no S3 or S4, no murmur, click or rub, no peripheral edema and peripheral pulses strong  ABDOMEN: soft, nontender, no hepatosplenomegaly, no masses and bowel sounds normal  MS: no gross musculoskeletal defects noted, no edema  SKIN: no suspicious lesions or rashes  NEURO: Normal strength and tone, mentation intact and speech normal  PSYCH: mentation appears normal, affect  "normal/bright  Diabetic foot exam: normal DP and PT pulses, no trophic changes or ulcerative lesions, normal sensory exam and normal monofilament exam        ASSESSMENT/PLAN:   Tyler was seen today for physical.    1. Routine general medical examination at a health care facility  - Lipid Profile (Chol, Trig, HDL, LDL calc); Future  - Comprehensive metabolic panel (BMP + Alb, Alk Phos, ALT, AST, Total. Bili, TP); Future  - PSA, screen; Future    2. Type 2 diabetes mellitus without complication, without long-term current use of insulin (H)  - Albumin Random Urine Quantitative with Creat Ratio; Future  - **A1C FUTURE 3mo; Future  - **A1C FUTURE 3mo  - Albumin Random Urine Quantitative with Creat Ratio  Diagnoses and all orders for this visit:  We will complete his labs today.  His A1c today has come down to normal at 5.9% from 9.0%.  There is no need to change medication at this time.  I did encourage him to continue to be on the right part regarding diet as well as exercise.  Will inform him of his lab results.  Otherwise we will see him in 6 months for follow-up.      Patient has been advised of split billing requirements and indicates understanding: Yes  COUNSELING:   Reviewed preventive health counseling, as reflected in patient instructions  Special attention given to:        Regular exercise       Healthy diet/nutrition    Estimated body mass index is 30.01 kg/m  as calculated from the following:    Height as of 7/27/20: 1.74 m (5' 8.5\").    Weight as of 7/27/20: 90.9 kg (200 lb 4.8 oz).     Weight management plan: Discussed healthy diet and exercise guidelines    He reports that he has never smoked. He has never used smokeless tobacco.      Counseling Resources:  ATP IV Guidelines  Pooled Cohorts Equation Calculator  FRAX Risk Assessment  ICSI Preventive Guidelines  Dietary Guidelines for Americans, 2010  USDA's MyPlate  ASA Prophylaxis  Lung CA Screening    Paddy Raymond MD  [facility] " [facility]

## 2021-09-15 ENCOUNTER — VIRTUAL VISIT (OUTPATIENT)
Dept: EDUCATION SERVICES | Facility: CLINIC | Age: 51
End: 2021-09-15
Payer: COMMERCIAL

## 2021-09-15 DIAGNOSIS — E11.9 DIABETES MELLITUS WITHOUT COMPLICATION (H): Primary | ICD-10-CM

## 2021-09-15 PROCEDURE — G0108 DIAB MANAGE TRN  PER INDIV: HCPCS | Mod: TEL | Performed by: FAMILY MEDICINE

## 2021-09-15 NOTE — PROGRESS NOTES
Diabetes Self-Management Education & Support    Presents for: Follow-up    Type of Visit: Telephone Visit    How would patient like to obtain AVS? Therese    ASSESSMENT:    F/u visit for diabetes education.    Excellent improvement. A1C:5.9 (7/28) < 9.0 (2/18) - congratulated pt and encouraged him to continue.     Pt shares that he has been very busy with remodeling his house and gone off track with some of the healthy changes he had been making. However, he is motivated to take charge now.    Wt has held steady at ~ 200 lb  He has been testing ~ 1x/d at diferrent times and BG ranges     No other concerns today.      Patient's most recent   Lab Results   Component Value Date    A1C 5.9 07/28/2021    is not meeting goal of < 5.6    Diabetes knowledge and skills assessment:   Patient is knowledgeable in diabetes management concepts related to: Healthy Eating, Being Active, Monitoring and Taking Medication    Continue education with the following diabetes management concepts: Healthy Eating, Being Active, Monitoring, Taking Medication and Problem Solving    Based on learning assessment above, most appropriate setting for further diabetes education would be: Individual setting.    INTERVENTIONS:    Education provided today on:  AADE Self-Care Behaviors:  Healthy Eating: portion control  Being Active: relationship to blood glucose  Monitoring: purpose and frequency of monitoring  Reducing Risks: A1C - goals, relating to blood glucose levels, how often to check  Healthy Coping: benefits of making appropriate lifestyle changes    Opportunities for ongoing education and support in diabetes-self management were discussed. Pt verbalized understanding of concepts discussed and recommendations provided today.       Education Materials Provided:  n/a    Goals Addressed as of 9/15/2021 at 2:24 PM        Being Active (pt-stated)     Added 9/15/21 by Autumn Nickerson RD      Exercise at least 3x/wk, 20 mins each time          "Healthy Eating (pt-stated)     Added 9/15/21 by Autumn Nickerson RD      Limit high carb foods and add more non starchy vegetables and protein to meals.          PLAN    Excellent improvement; pt has been focusing on lifestyle modification - congratulated pt and encouraged him to continue.  Recommend we continue with the current therapy and DM management plan - regular monitoring of BG, daily physical activity as able/safe, eating healthy & watching portions of carbs.  Pt to continue to test BG at least 1x/d, at different times and keep a BG log for review.    Topics to cover at upcoming visits: Being Active, Monitoring, Problem Solving and Reducing Risks  Follow-up: 12/8    See Goals Section for co-developed, patient-stated behavior change goals.  AVS provided to patient today.          SUBJECTIVE / OBJECTIVE:  Presents for: Follow-up  Accompanied by: Self  Focus of Visit: Monitoring, Reducing Risks, Taking Medication, Healthy Eating, Self-care behavioral goal setting, Assistance w/ making life changes  Diabetes type: Type 2  Disease course: Improving  Diabetes management related comments/concerns: has been very busy with remodelling his house and admits to having gone off track with the activity.  Other concerns:: None  Cultural Influences/Ethnic Background:  Not  or       Diabetes Symptoms & Complications:  Fatigue: No  Neuropathy: No  Polydipsia: No  Polyphagia: No  Polyuria: No  Visual change: No  Slow healing wounds: No  Symptom course: Improving  Weight trend: Stable (wt has held steady ~ 200 lb)       Patient Problem List and Family Medical History reviewed for relevant medical history, current medical status, and diabetes risk factors.    Vitals:  There were no vitals taken for this visit.  Estimated body mass index is 29.85 kg/m  as calculated from the following:    Height as of 7/28/21: 1.74 m (5' 8.5\").    Weight as of 7/28/21: 90.4 kg (199 lb 3.2 oz).   Last 3 BP:   BP Readings from Last 3 " Encounters:   07/28/21 100/76   07/27/20 118/78       History   Smoking Status     Never Smoker   Smokeless Tobacco     Never Used       Labs:  Lab Results   Component Value Date    A1C 5.9 07/28/2021     Lab Results   Component Value Date     02/18/2021     Lab Results   Component Value Date    LDL 92 02/18/2021     Direct Measure HDL   Date Value Ref Range Status   02/18/2021 41 >=40 mg/dL Final   ]  GFR Estimate   Date Value Ref Range Status   02/18/2021 >60 >60 mL/min/1.73m2 Final     GFR Estimate If Black   Date Value Ref Range Status   02/18/2021 >60 >60 mL/min/1.73m2 Final     Lab Results   Component Value Date    CR 1.10 02/18/2021     No results found for: MICROALBUMIN    Healthy Eating:  Healthy Eating Assessed Today: Yes  Cultural/Congregational diet restrictions?: No  Meal planning/habits: Low carb, Smaller portions  How many times a week on average do you eat food made away from home (restaurant/take-out)?: 0  Meals include: Breakfast, Lunch, Dinner    Being Active:       Monitoring:  Monitoring Assessed Today: Yes (Tests 1x/d at different times. BG ranges 83 - 166)  Times checking blood sugar at home (number): 1  Times checking blood sugar at home (per): Day  Blood glucose trend: Decreasing      Taking Medications:  Diabetes Medication(s)     Biguanides       metFORMIN (GLUCOPHAGE-XR) 500 MG 24 hr tablet    [METFORMIN (GLUCOPHAGE-XR) 500 MG 24 HR TABLET] TAKE 4 TABLETS DAILY (2000 MG TOTAL).          Taking Medication Assessed Today: Yes (Taking 2000 mg metformin daily)  Current Treatments: Diet, Oral Medication (taken by mouth)  Problems taking diabetes medications regularly?: No  Diabetes medication side effects?: No    Problem Solving:  Problem Solving Assessed Today: Yes  Is the patient at risk for hypoglycemia?: No    Reducing Risks:  Reducing Risks Assessed Today: Yes    Healthy Coping:  Healthy Coping Assessed Today: Yes  Emotional response to diabetes: Acceptance  Informal Support system::  Family  Stage of change: MAINTENANCE (Working to maintain change, with risk of relapse)  Patient Activation Measure Survey Score:  No flowsheet data found.            Time Spent: 30 minutes  Encounter Type: Individual        Any diabetes medication dose changes were made via the Certified Diabetes Care & Education Protocol in collaboration with the patient's referring provider. A copy of this encounter was shared with the provider.

## 2021-10-11 ENCOUNTER — HEALTH MAINTENANCE LETTER (OUTPATIENT)
Age: 51
End: 2021-10-11

## 2021-11-06 DIAGNOSIS — E11.9 TYPE 2 DIABETES MELLITUS WITHOUT COMPLICATION, WITHOUT LONG-TERM CURRENT USE OF INSULIN (H): ICD-10-CM

## 2021-11-08 RX ORDER — METFORMIN HCL 500 MG
TABLET, EXTENDED RELEASE 24 HR ORAL
Qty: 360 TABLET | Refills: 1 | Status: SHIPPED | OUTPATIENT
Start: 2021-11-08 | End: 2022-07-14

## 2021-11-09 NOTE — TELEPHONE ENCOUNTER
"Last Written Prescription Date:  5/18/21  Last Fill Quantity: 240,  # refills: 2  Last office visit provider:  7/28/21    Requested Prescriptions   Pending Prescriptions Disp Refills     metFORMIN (GLUCOPHAGE-XR) 500 MG 24 hr tablet [Pharmacy Med Name: METFORMIN HCL  MG TABLET] 360 tablet 1     Sig: TAKE 4 TABLET BY MOUTH DAILY.       Biguanide Agents Passed - 11/6/2021  9:12 AM        Passed - Patient is age 10 or older        Passed - Patient has documented A1c within the specified period of time.     If HgbA1C is 8 or greater, it needs to be on file within the past 3 months.  If less than 8, must be on file within the past 6 months.     Recent Labs   Lab Test 07/28/21  0842   A1C 5.9*             Passed - Patient's CR is NOT>1.4 OR Patient's EGFR is NOT<45 within past 12 mos.     Recent Labs   Lab Test 02/18/21  0857   GFRESTIMATED >60   GFRESTBLACK >60       Recent Labs   Lab Test 02/18/21  0857   CR 1.10             Passed - Patient does NOT have a diagnosis of CHF.        Passed - Medication is active on med list        Passed - Recent (6 mo) or future (30 days) visit within the authorizing provider's specialty     Patient had office visit in the last 6 months or has a visit in the next 30 days with authorizing provider or within the authorizing provider's specialty.  See \"Patient Info\" tab in inbasket, or \"Choose Columns\" in Meds & Orders section of the refill encounter.                 Nohemi Galindo RN 11/08/21 8:17 PM  "

## 2021-12-05 ENCOUNTER — HEALTH MAINTENANCE LETTER (OUTPATIENT)
Age: 51
End: 2021-12-05

## 2021-12-08 ENCOUNTER — VIRTUAL VISIT (OUTPATIENT)
Dept: EDUCATION SERVICES | Facility: CLINIC | Age: 51
End: 2021-12-08
Payer: COMMERCIAL

## 2021-12-08 DIAGNOSIS — E11.9 DIABETES MELLITUS WITHOUT COMPLICATION (H): Primary | ICD-10-CM

## 2021-12-08 PROCEDURE — G0108 DIAB MANAGE TRN  PER INDIV: HCPCS | Mod: AE

## 2021-12-08 NOTE — PROGRESS NOTES
Diabetes Self-Management Education & Support    Presents for: Follow-up    Type of Visit: Telephone Visit    How would patient like to obtain AVS? Therese    ASSESSMENT:    F/u visit for diabetes education.    A1C:5.9 (7/28) < 9.0 (2/18) - congratulated pt and encouraged him to continue.     Taking 2000 mg metformin daily with SE. His goal is to go off his diabetes meds - discussed that we will continue to assess.     Has continued to test ~ 1x/d, at different times and BG ranges 130-150, with most readings in 130s.     He had been sick with flu like sx for the past 1-2 weeks but is feeling better now.  Has had his flu shot and also both doses of COVID vaccine.    Reports high work related stress and hence having gone off track with some of the healthy changes he had been making. However, he is motivated to get into a healthy routine now.     Has been drinking more water now.     Wt has held steady at ~ 200 lb    He will be due for A1C redraw at his next PCP appt in January.     No other concerns today.      Patient's most recent   Lab Results   Component Value Date    A1C 5.9 07/28/2021    is not meeting goal of < 5.6    Diabetes knowledge and skills assessment:   Patient is knowledgeable in diabetes management concepts related to: Healthy Eating, Being Active, Monitoring and Taking Medication    Continue education with the following diabetes management concepts: Healthy Eating, Problem Solving and Reducing Risks    Based on learning assessment above, most appropriate setting for further diabetes education would be: Individual setting.    INTERVENTIONS:    Education provided today on:  AADE Self-Care Behaviors:  Healthy Eating: weight reduction and portion control  Being Active: relationship to blood glucose  Monitoring: individual blood glucose targets and frequency of monitoring  Problem Solving: high blood glucose - causes, signs/symptoms, treatment and prevention  Healthy Coping: benefits of making appropriate  lifestyle changes and utilize support systems    Opportunities for ongoing education and support in diabetes-self management were discussed. Pt verbalized understanding of concepts discussed and recommendations provided today.       Education Materials Provided:  n/a      Goals Addressed as of 2021 at 10:31 AM                    Today       Being Active (pt-stated)   80%    Added 9/15/21 by Autumn Nickerson RD      Exercise at least 3x/wk, 20 mins each time         Healthy Eating (pt-stated)   On track    Added 9/15/21 by Autumn Nickerson RD      Limit high carb foods and add more non starchy vegetables and protein to meals.          PLAN    Recommend we continue with the current therapy and DM management plan - regular monitoring of BG, daily physical activity as able/safe, eating healthy & watching portions of carbs.  Pt to continue to test BG 1x/d, at different times and keep a BG log for review.  FB-130       2 hr Post Meal:     Topics to cover at upcoming visits: Healthy Eating, Being Active, Monitoring, Problem Solving and Reducing Risks  Follow-up: 3 months  PCP:, pt will also be due for A1C draw    See Goals Section for co-developed, patient-stated behavior change goals.  AVS provided to patient today.          SUBJECTIVE / OBJECTIVE:  Presents for: Follow-up  Accompanied by: Self  Diabetes education in the past 24mo: Yes  Focus of Visit: Assistance w/ making life changes,Self-care behavioral goal setting  Diabetes type: Type 2  Disease course: Stable  Other concerns::  (reports high work related stress and admits to being a stress eater. Was sick x 1-2 weeks with flu like symptoms - has had his flu shot and has also taken both doses of Covid vaccine)  Cultural Influences/Ethnic Background:  Not  or       Diabetes Symptoms & Complications:  Weight trend: Stable       Patient Problem List and Family Medical History reviewed for relevant medical history, current medical status, and  "diabetes risk factors.    Vitals:  There were no vitals taken for this visit.  Estimated body mass index is 29.85 kg/m  as calculated from the following:    Height as of 7/28/21: 1.74 m (5' 8.5\").    Weight as of 7/28/21: 90.4 kg (199 lb 3.2 oz).   Last 3 BP:   BP Readings from Last 3 Encounters:   07/28/21 100/76   07/27/20 118/78       History   Smoking Status     Never Smoker   Smokeless Tobacco     Never Used       Labs:  Lab Results   Component Value Date    A1C 5.9 07/28/2021     Lab Results   Component Value Date     02/18/2021     Lab Results   Component Value Date    LDL 92 02/18/2021     Direct Measure HDL   Date Value Ref Range Status   02/18/2021 41 >=40 mg/dL Final   ]  GFR Estimate   Date Value Ref Range Status   02/18/2021 >60 >60 mL/min/1.73m2 Final     GFR Estimate If Black   Date Value Ref Range Status   02/18/2021 >60 >60 mL/min/1.73m2 Final     Lab Results   Component Value Date    CR 1.10 02/18/2021     No results found for: MICROALBUMIN    Healthy Eating:  Healthy Eating Assessed Today: Yes (Has not had best diet, but would like to get back on track now)  Cultural/Pentecostalism diet restrictions?: No  Meal planning/habits: Smaller portions  Meals include: Breakfast,Lunch,Dinner,Afternoon Snack  Beverages: Water,Soda (Drinking more water. Occasional pop still)    Being Active:  Being Active Assessed Today: Yes (Has not been exercising for the past ~ 1 month)  Barrier to exercise: Other (Work related stress, was sick for a couple weeks)    Monitoring:  Monitoring Assessed Today: Yes (Has been testing 1x/d. FBG ranges,130-150, with most numbers in 130s)      Taking Medications:  Diabetes Medication(s)     Biguanides       metFORMIN (GLUCOPHAGE-XR) 500 MG 24 hr tablet    TAKE 4 TABLET BY MOUTH DAILY.          Taking Medication Assessed Today: Yes  Current Treatments: Diet,Oral Medication (taken by mouth)  Problems taking diabetes medications regularly?: No  Diabetes medication side effects?: " No    Problem Solving:  Is the patient at risk for hypoglycemia?: No       Reducing Risks:       Healthy Coping:  Emotional response to diabetes: Acceptance  Informal Support system:: Family,Spouse  Stage of change: ACTION (Actively working towards change)  Patient Activation Measure Survey Score:  No flowsheet data found.      Time Spent: 30 minutes  Encounter Type: Individual        Any diabetes medication dose changes were made via the Certified Diabetes Care & Education Protocol in collaboration with the patient's referring provider. A copy of this encounter was shared with the provider.

## 2022-01-04 DIAGNOSIS — E11.9 DIABETES MELLITUS WITHOUT COMPLICATION (H): ICD-10-CM

## 2022-01-06 ENCOUNTER — TRANSFERRED RECORDS (OUTPATIENT)
Dept: HEALTH INFORMATION MANAGEMENT | Facility: CLINIC | Age: 52
End: 2022-01-06
Payer: COMMERCIAL

## 2022-01-06 LAB — RETINOPATHY: NEGATIVE

## 2022-01-06 RX ORDER — BLOOD SUGAR DIAGNOSTIC
STRIP MISCELLANEOUS
Qty: 200 STRIP | Refills: 1 | Status: SHIPPED | OUTPATIENT
Start: 2022-01-06 | End: 2023-03-09

## 2022-01-06 NOTE — TELEPHONE ENCOUNTER
"Last Written Prescription Date:  02/03/2021  Last Fill Quantity: 100,  # refills: 6   Last office visit provider:   07/28/2021 with Dr Raymond.    Requested Prescriptions   Pending Prescriptions Disp Refills     ONETOUCH VERIO IQ test strip [Pharmacy Med Name: ONE TOUCH VERIO TEST STRIP] 200 strip 3     Sig: TEST 2 TIMES DAILY       Diabetic Supplies Protocol Passed - 1/4/2022  2:37 PM        Passed - Medication is active on med list        Passed - Patient is 18 years of age or older        Passed - Recent (6 mo) or future (30 days) visit within the authorizing provider's specialty     Patient had office visit in the last 6 months or has a visit in the next 30 days with authorizing provider.  See \"Patient Info\" tab in inbasket, or \"Choose Columns\" in Meds & Orders section of the refill encounter.                 Chapis Guy 01/06/22 4:38 PM  "

## 2022-01-26 ENCOUNTER — OFFICE VISIT (OUTPATIENT)
Dept: FAMILY MEDICINE | Facility: CLINIC | Age: 52
End: 2022-01-26
Payer: COMMERCIAL

## 2022-01-26 VITALS
WEIGHT: 205.5 LBS | DIASTOLIC BLOOD PRESSURE: 80 MMHG | OXYGEN SATURATION: 97 % | BODY MASS INDEX: 30.79 KG/M2 | HEART RATE: 72 BPM | SYSTOLIC BLOOD PRESSURE: 110 MMHG

## 2022-01-26 DIAGNOSIS — G47.33 OSA (OBSTRUCTIVE SLEEP APNEA): ICD-10-CM

## 2022-01-26 DIAGNOSIS — E11.9 TYPE 2 DIABETES MELLITUS WITHOUT COMPLICATION, WITHOUT LONG-TERM CURRENT USE OF INSULIN (H): Primary | ICD-10-CM

## 2022-01-26 LAB — HBA1C MFR BLD: 6.3 % (ref 0–5.6)

## 2022-01-26 PROCEDURE — 99213 OFFICE O/P EST LOW 20 MIN: CPT | Performed by: FAMILY MEDICINE

## 2022-01-26 PROCEDURE — 36415 COLL VENOUS BLD VENIPUNCTURE: CPT | Performed by: FAMILY MEDICINE

## 2022-01-26 PROCEDURE — 83036 HEMOGLOBIN GLYCOSYLATED A1C: CPT | Performed by: FAMILY MEDICINE

## 2022-01-26 NOTE — PROGRESS NOTES
"  Assessment & Plan     Type 2 diabetes mellitus without complication, without long-term current use of insulin (H)  - **A1C FUTURE 3mo  - **A1C FUTURE 3mo    RONEY (obstructive sleep apnea)  Today's A1c is a slightly higher than what it was in the past at 6.3%, but it is still within the recommended level.  We did discuss this and I do not think that he needs to have medication changes at this point.  I encouraged him to work on exercise as well as diet changes.  He will continue to follow-up with the dietitian/diabetic educator at this time.  Noted no major concerns is a feeling well.  We will see him in about 6 months at which point we will probably do his physical exam.      :[id number]}     BMI:   Estimated body mass index is 30.79 kg/m  as calculated from the following:    Height as of 7/28/21: 1.74 m (5' 8.5\").    Weight as of this encounter: 93.2 kg (205 lb 8 oz).        No follow-ups on file.    Paddy Raymond MD  [facility]    Dilcia Scott is a 51 year old who presents for the following health issues     HPI   Is here for diabetic follow-up.  Does not have any symptoms today.  Noted that his numbers may probably be a little bit high because of the holiday.  He does not check his glucose consistently.  Denied any hypoglycemic episodes.  Does continue to be physically active and exercising.    Family History   Problem Relation Age of Onset     Sleep Apnea Father      Sleep Apnea Sister      Breast Cancer Mother      Prostate Cancer Father      Asthma Son      Colon Cancer Maternal Grandfather       Social History     Socioeconomic History     Marital status:      Spouse name: Not on file     Number of children: Not on file     Years of education: Not on file     Highest education level: Not on file   Occupational History     Not on file   Tobacco Use     Smoking status: Never Smoker     Smokeless tobacco: Never Used   Substance and Sexual Activity     Alcohol " use: Not on file     Drug use: Not on file     Sexual activity: Not on file   Other Topics Concern     Not on file   Social History Narrative     Not on file     Social Determinants of Health     Financial Resource Strain: Not on file   Food Insecurity: Not on file   Transportation Needs: Not on file   Physical Activity: Not on file   Stress: Not on file   Social Connections: Not on file   Intimate Partner Violence: Not on file   Housing Stability: Not on file      Past Surgical History:   Procedure Laterality Date     MANDIBLE SURGERY        Past Medical History:   Diagnosis Date     Asthma     As a youth.     Rheumatic fever     As child     Seasonal allergies             Review of Systems   CONSTITUTIONAL: NEGATIVE for fever, chills, change in weight  ENT/MOUTH: NEGATIVE for ear, mouth and throat problems  RESP: NEGATIVE for significant cough or SOB  CV: NEGATIVE for chest pain, palpitations or peripheral edema      Objective    /80 (BP Location: Left arm, Patient Position: Sitting, Cuff Size: Adult Large)   Pulse 72   Wt 93.2 kg (205 lb 8 oz)   SpO2 97%   BMI 30.79 kg/m    Body mass index is 30.79 kg/m .  Physical Exam   GENERAL: healthy, alert and no distress  NECK: no adenopathy, no asymmetry, masses, or scars and thyroid normal to palpation  RESP: lungs clear to auscultation - no rales, rhonchi or wheezes  CV: regular rate and rhythm, normal S1 S2, no murmur, click or rub, no peripheral edema and peripheral pulses strong  ABDOMEN: soft, nontender, no hepatosplenomegaly, no masses and bowel sounds normal  MS: no gross musculoskeletal defects noted, no edema

## 2022-05-22 ENCOUNTER — HEALTH MAINTENANCE LETTER (OUTPATIENT)
Age: 52
End: 2022-05-22

## 2022-07-13 DIAGNOSIS — E11.9 TYPE 2 DIABETES MELLITUS WITHOUT COMPLICATION, WITHOUT LONG-TERM CURRENT USE OF INSULIN (H): ICD-10-CM

## 2022-07-14 NOTE — TELEPHONE ENCOUNTER
"Routing refill request to provider for review/approval because:  Labs not current:  Creatinine    Last Written Prescription Date:  11/8/21  Last Fill Quantity: 360,  # refills: 1   Last office visit provider:  1/26/22     Requested Prescriptions   Pending Prescriptions Disp Refills     metFORMIN (GLUCOPHAGE XR) 500 MG 24 hr tablet [Pharmacy Med Name: METFORMIN HCL  MG TABLET] 360 tablet 1     Sig: TAKE 4 TABLET BY MOUTH DAILY.       Biguanide Agents Failed - 7/14/2022  9:44 AM        Failed - Patient's CR is NOT>1.4 OR Patient's EGFR is NOT<45 within past 12 mos.     Recent Labs   Lab Test 02/18/21  0857   GFRESTIMATED >60   GFRESTBLACK >60       Recent Labs   Lab Test 02/18/21  0857   CR 1.10             Passed - Patient is age 10 or older        Passed - Patient has documented A1c within the specified period of time.     If HgbA1C is 8 or greater, it needs to be on file within the past 3 months.  If less than 8, must be on file within the past 6 months.     Recent Labs   Lab Test 01/26/22  1624   A1C 6.3*             Passed - Patient does NOT have a diagnosis of CHF.        Passed - Medication is active on med list        Passed - Recent (6 mo) or future (30 days) visit within the authorizing provider's specialty     Patient had office visit in the last 6 months or has a visit in the next 30 days with authorizing provider or within the authorizing provider's specialty.  See \"Patient Info\" tab in inbasket, or \"Choose Columns\" in Meds & Orders section of the refill encounter.                 Kobe Dan RN 07/14/22 9:45 AM  "

## 2022-07-15 RX ORDER — METFORMIN HCL 500 MG
TABLET, EXTENDED RELEASE 24 HR ORAL
Qty: 360 TABLET | Refills: 0 | Status: SHIPPED | OUTPATIENT
Start: 2022-07-15 | End: 2022-10-12

## 2022-09-24 ENCOUNTER — HEALTH MAINTENANCE LETTER (OUTPATIENT)
Age: 52
End: 2022-09-24

## 2022-10-12 DIAGNOSIS — E11.9 TYPE 2 DIABETES MELLITUS WITHOUT COMPLICATION, WITHOUT LONG-TERM CURRENT USE OF INSULIN (H): ICD-10-CM

## 2022-10-13 NOTE — TELEPHONE ENCOUNTER
"Former patient of Graci[facility] & has not established care with another provider.  Please assign refill request to covering provider per clinic standard process.    Routing refill request to provider for review/approval because:  Labs not current:  Multiple  Patient needs to be seen because it has been more than 6 months since last office visit.  No PCP    Last Written Prescription Date:  7/15/22  Last Fill Quantity: 360,  # refills: 0   Last office visit provider:  1/26/22     Requested Prescriptions   Pending Prescriptions Disp Refills     metFORMIN (GLUCOPHAGE XR) 500 MG 24 hr tablet 360 tablet 0       Biguanide Agents Failed - 10/13/2022  8:19 AM        Failed - Patient has documented A1c within the specified period of time.     If HgbA1C is 8 or greater, it needs to be on file within the past 3 months.  If less than 8, must be on file within the past 6 months.     Recent Labs   Lab Test 01/26/22  1624   A1C 6.3*             Failed - Patient's CR is NOT>1.4 OR Patient's EGFR is NOT<45 within past 12 mos.     Recent Labs   Lab Test 02/18/21  0857   GFRESTIMATED >60   GFRESTBLACK >60       Recent Labs   Lab Test 02/18/21  0857   CR 1.10             Failed - Recent (6 mo) or future (30 days) visit within the authorizing provider's specialty     Patient had office visit in the last 6 months or has a visit in the next 30 days with authorizing provider or within the authorizing provider's specialty.  See \"Patient Info\" tab in inbasket, or \"Choose Columns\" in Meds & Orders section of the refill encounter.            Passed - Patient is age 10 or older        Passed - Patient does NOT have a diagnosis of CHF.        Passed - Medication is active on med list           STATIN NOT PRESCRIBED (INTENTIONAL)       Sig: Please choose reason not prescribed from choices below.       Statins Protocol Failed - 10/13/2022  8:19 AM        Failed - LDL on file in past 12 months     Recent Labs   Lab Test 02/18/21  0857   LDL 92             " "Failed - Medication is active on med list        Passed - No abnormal creatine kinase in past 12 months     No lab results found.             Passed - Recent (12 mo) or future (30 days) visit within the authorizing provider's specialty     Patient has had an office visit with the authorizing provider or a provider within the authorizing providers department within the previous 12 mos or has a future within next 30 days. See \"Patient Info\" tab in inbasket, or \"Choose Columns\" in Meds & Orders section of the refill encounter.              Passed - Patient is age 18 or older             Kobe Dan RN 10/13/22 8:19 AM  "

## 2022-10-24 NOTE — TELEPHONE ENCOUNTER
[employer] message sent to patient to call back and schedule an establish care appointment,

## 2022-10-25 NOTE — TELEPHONE ENCOUNTER
10-25-22  LM pt needs an appt:  unfortunately this would have to be at a different location other than [place], here at [place] our remaining provider panels are now closed which means they are no longer seeing any more patients than what is already assigned to them

## 2022-10-26 NOTE — TELEPHONE ENCOUNTER
10-26-22  LM pt needs an appt:  unfortunately this would have to be at a different location other than [place], here at [place] our remaining provider panels are now closed which means they are no longer seeing any more patients than what is already assigned to them

## 2022-10-28 NOTE — TELEPHONE ENCOUNTER
10-28-22  LM pt needs an appt:  unfortunately this would have to be at a different location other than [place], here at [place] our remaining provider panels are now closed which means they are no longer seeing any more patients than what is already assigned to them  &   I sent pt a my-chart msg   krstal

## 2022-11-17 RX ORDER — METFORMIN HCL 500 MG
TABLET, EXTENDED RELEASE 24 HR ORAL
Qty: 360 TABLET | Refills: 0 | Status: SHIPPED | OUTPATIENT
Start: 2022-11-17 | End: 2023-01-31

## 2022-11-17 NOTE — TELEPHONE ENCOUNTER
Routing refill request to provider for review/approval because:  No PCP    Kelly Canela RN, BSN  [facility]

## 2022-11-18 ENCOUNTER — LAB (OUTPATIENT)
Dept: LAB | Facility: CLINIC | Age: 52
End: 2022-11-18
Payer: COMMERCIAL

## 2022-11-18 DIAGNOSIS — E11.9 TYPE 2 DIABETES MELLITUS WITHOUT COMPLICATION, WITHOUT LONG-TERM CURRENT USE OF INSULIN (H): ICD-10-CM

## 2022-11-18 LAB
ANION GAP SERPL CALCULATED.3IONS-SCNC: 11 MMOL/L (ref 7–15)
BUN SERPL-MCNC: 13.4 MG/DL (ref 6–20)
CALCIUM SERPL-MCNC: 9 MG/DL (ref 8.6–10)
CHLORIDE SERPL-SCNC: 106 MMOL/L (ref 98–107)
CREAT SERPL-MCNC: 1.2 MG/DL (ref 0.67–1.17)
DEPRECATED HCO3 PLAS-SCNC: 25 MMOL/L (ref 22–29)
GFR SERPL CREATININE-BSD FRML MDRD: 72 ML/MIN/1.73M2
GLUCOSE SERPL-MCNC: 154 MG/DL (ref 70–99)
HBA1C MFR BLD: 6.8 % (ref 0–5.6)
POTASSIUM SERPL-SCNC: 4.3 MMOL/L (ref 3.4–5.3)
SODIUM SERPL-SCNC: 142 MMOL/L (ref 136–145)

## 2022-11-18 PROCEDURE — 80048 BASIC METABOLIC PNL TOTAL CA: CPT

## 2022-11-18 PROCEDURE — 80061 LIPID PANEL: CPT

## 2022-11-18 PROCEDURE — 36415 COLL VENOUS BLD VENIPUNCTURE: CPT

## 2022-11-18 PROCEDURE — 82043 UR ALBUMIN QUANTITATIVE: CPT

## 2022-11-18 PROCEDURE — 83036 HEMOGLOBIN GLYCOSYLATED A1C: CPT

## 2022-11-19 LAB
CHOLEST SERPL-MCNC: 147 MG/DL
CREAT UR-MCNC: 262 MG/DL
HDLC SERPL-MCNC: 38 MG/DL
LDLC SERPL CALC-MCNC: 85 MG/DL
MICROALBUMIN UR-MCNC: <12 MG/L
MICROALBUMIN/CREAT UR: NORMAL MG/G{CREAT}
NONHDLC SERPL-MCNC: 109 MG/DL
TRIGL SERPL-MCNC: 119 MG/DL

## 2022-11-28 ENCOUNTER — VIRTUAL VISIT (OUTPATIENT)
Dept: EDUCATION SERVICES | Facility: CLINIC | Age: 52
End: 2022-11-28
Payer: COMMERCIAL

## 2022-11-28 DIAGNOSIS — E11.9 DIABETES MELLITUS (H): Primary | ICD-10-CM

## 2022-11-28 PROCEDURE — G0108 DIAB MANAGE TRN  PER INDIV: HCPCS | Mod: AE | Performed by: DIETITIAN, REGISTERED

## 2022-11-28 NOTE — PROGRESS NOTES
Diabetes Self-Management Education & Support    Presents for:      Type of Service: Telephone Visit    Assessment Type:   ASSESSMENT:     Follow up diabetes education visit.       Component Ref Range & Units 10 d ago   (22) 10 mo ago   (22) 1 yr ago   (21) 1 yr ago   (21) 1 yr ago   (12/10/20)    Hemoglobin A1C 0.0 - 5.6 % 6.8 High   6.3 High  CM  5.9 High  CM  9.0 High  R  8.5 High  R    Comment: Normal <5.7%          We discussed A1C results and reviewed BG and A1C goals.  Pt endorses having had gone off track with diet and activity guidelines.    Taking 2000 mg metformin daily with SE.   His goal has been to go off his diabetes meds - reviewed that we will continue to assess.     Continues to test ~ 1x/d. FBG typically  ranges 140-150     Reports having gone off track with some of the healthy changes he had been making. However, he is motivated to get into a healthy routine now.      Drinking more water now but also continues to drink 2-3 cans of soda/wk     Wt has held steady  Ranges 200 - 205 lb    Exercises daily: wt training, bench pressing, push ups     No other concerns today.     Patient's most recent   Lab Results   Component Value Date    A1C 6.8 2022     is meeting goal of <7.0    Diabetes knowledge and skills assessment:   Patient is knowledgeable in diabetes management concepts related to: needs AADE 7 review     Continue education with the following diabetes management concepts: needs AADE 7 review     Based on learning assessment above, most appropriate setting for further diabetes education would be: Individual setting.      PLAN     Recommend no changes in the current therapy.    Pt to focus on dietary changes and regular activity, as able/safe.    To quit drinking soda and drink more water instead.    Pt to continue to test BG 1x/d, at different times and keep a BG log for review.  FB-130         2 hr Post Meal:      Topics to cover at upcoming visits: Healthy  "Eating, Being Active, Monitoring, Problem Solving and Reducing Risks    Follow-up: 2/6  PCP: pt will call to schedule a diabetes check/A1C draw for Feb/March     See Care Plan for co-developed, patient-state behavior change goals.    SUBJECTIVE/OBJECTIVE:     Cultural Influences/Ethnic Background:  Not  or     Diabetes Symptoms & Complications:     Patient Problem List and Family Medical History reviewed for relevant medical history, current medical status, and diabetes risk factors.    Vitals:  There were no vitals taken for this visit.  Estimated body mass index is 30.79 kg/m  as calculated from the following:    Height as of 7/28/21: 1.74 m (5' 8.5\").    Weight as of 1/26/22: 93.2 kg (205 lb 8 oz).   Last 3 BP:   BP Readings from Last 3 Encounters:   01/26/22 110/80   07/28/21 100/76   07/27/20 118/78       History   Smoking Status     Never   Smokeless Tobacco     Never       Labs:  Lab Results   Component Value Date    A1C 6.8 11/18/2022     Lab Results   Component Value Date     11/18/2022     02/18/2021     Lab Results   Component Value Date    LDL 85 11/18/2022     Direct Measure HDL   Date Value Ref Range Status   11/18/2022 38 (L) >=40 mg/dL Final   ]  GFR Estimate   Date Value Ref Range Status   11/18/2022 72 >60 mL/min/1.73m2 Final     Comment:     Effective December 21, 2021 eGFRcr in adults is calculated using the 2021 CKD-EPI creatinine equation which includes age and gender (Tanvi seo al., NEJ, DOI: 10.1056/QSZIsa6076078)   02/18/2021 >60 >60 mL/min/1.73m2 Final     GFR Estimate If Black   Date Value Ref Range Status   02/18/2021 >60 >60 mL/min/1.73m2 Final     Lab Results   Component Value Date    CR 1.20 11/18/2022     No results found for: MICROALBUMIN    Taking Medications:  Diabetes Medication(s)     Biguanides       metFORMIN (GLUCOPHAGE XR) 500 MG 24 hr tablet    TAKE 4 TABLET BY MOUTH DAILY. Strength: 500 mg        Patient Activation Measure Survey Score:  No " flowsheet data found.    Care Plan and Education Provided:  Healthy eating, physical activity, problem solving, reducing risks    Time Spent: 60 minutes  Encounter Type: Individual    Any diabetes medication dose changes were made via the CDE Protocol per the patient's referring provider. A copy of this encounter was shared with the provider.

## 2022-11-28 NOTE — LETTER
11/28/2022         RE: Tyler Turk  [address] [address]        Dear Colleague,    Thank you for referring your patient, Tyler Turk, to the [facility]. Please see a copy of my visit note below.    Diabetes Self-Management Education & Support    Presents for:      Type of Service: Telephone Visit    Assessment Type:   ASSESSMENT:     Follow up diabetes education visit.       Component Ref Range & Units 10 d ago   (11/18/22) 10 mo ago   (1/26/22) 1 yr ago   (7/28/21) 1 yr ago   (2/18/21) 1 yr ago   (12/10/20)    Hemoglobin A1C 0.0 - 5.6 % 6.8 High   6.3 High  CM  5.9 High  CM  9.0 High  R  8.5 High  R    Comment: Normal <5.7%          We discussed A1C results and reviewed BG and A1C goals.  Pt endorses having had gone off track with diet and activity guidelines.    Taking 2000 mg metformin daily with SE.   His goal has been to go off his diabetes meds - reviewed that we will continue to assess.     Continues to test ~ 1x/d. FBG typically  ranges 140-150     Reports having gone off track with some of the healthy changes he had been making. However, he is motivated to get into a healthy routine now.      Drinking more water now but also continues to drink 2-3 cans of soda/wk     Wt has held steady  Ranges 200 - 205 lb    Exercises daily: wt training, bench pressing, push ups     No other concerns today.     Patient's most recent   Lab Results   Component Value Date    A1C 6.8 11/18/2022     is meeting goal of <7.0    Diabetes knowledge and skills assessment:   Patient is knowledgeable in diabetes management concepts related to: needs AADE 7 review     Continue education with the following diabetes management concepts: needs AADE 7 review     Based on learning assessment above, most appropriate setting for further diabetes education would be: Individual setting.      PLAN     Recommend no changes in the current therapy.    Pt to focus on dietary changes and  "regular activity, as able/safe.    Pt to continue to test BG 1x/d, at different times and keep a BG log for review.  FB-130         2 hr Post Meal:      Topics to cover at upcoming visits: Healthy Eating, Being Active, Monitoring, Problem Solving and Reducing Risks    Follow-up:   PCP: pt will call to schedule a diabetes check/A1C draw for      See Care Plan for co-developed, patient-state behavior change goals.    SUBJECTIVE/OBJECTIVE:     Cultural Influences/Ethnic Background:  Not  or     Diabetes Symptoms & Complications:     Patient Problem List and Family Medical History reviewed for relevant medical history, current medical status, and diabetes risk factors.    Vitals:  There were no vitals taken for this visit.  Estimated body mass index is 30.79 kg/m  as calculated from the following:    Height as of 21: 1.74 m (5' 8.5\").    Weight as of 22: 93.2 kg (205 lb 8 oz).   Last 3 BP:   BP Readings from Last 3 Encounters:   22 110/80   21 100/76   20 118/78       History   Smoking Status     Never   Smokeless Tobacco     Never       Labs:  Lab Results   Component Value Date    A1C 6.8 2022     Lab Results   Component Value Date     2022     2021     Lab Results   Component Value Date    LDL 85 2022     Direct Measure HDL   Date Value Ref Range Status   2022 38 (L) >=40 mg/dL Final   ]  GFR Estimate   Date Value Ref Range Status   2022 72 >60 mL/min/1.73m2 Final     Comment:     Effective 2021 eGFRcr in adults is calculated using the  CKD-EPI creatinine equation which includes age and gender (Tanvi seo al., NEJM, DOI: 10.1056/GTZPns5475639)   2021 >60 >60 mL/min/1.73m2 Final     GFR Estimate If Black   Date Value Ref Range Status   2021 >60 >60 mL/min/1.73m2 Final     Lab Results   Component Value Date    CR 1.20 2022     No results found for: MICROALBUMIN    Taking " Medications:  Diabetes Medication(s)     Biguanides       metFORMIN (GLUCOPHAGE XR) 500 MG 24 hr tablet    TAKE 4 TABLET BY MOUTH DAILY. Strength: 500 mg        Patient Activation Measure Survey Score:  No flowsheet data found.    Care Plan and Education Provided:  Healthy eating, physical activity, problem solving, reducing risks    Time Spent: 60 minutes  Encounter Type: Individual    Any diabetes medication dose changes were made via the CDE Protocol per the patient's referring provider. A copy of this encounter was shared with the provider.

## 2022-11-30 ENCOUNTER — VIRTUAL VISIT (OUTPATIENT)
Dept: FAMILY MEDICINE | Facility: CLINIC | Age: 52
End: 2022-11-30
Payer: COMMERCIAL

## 2022-11-30 DIAGNOSIS — U07.1 INFECTION DUE TO 2019 NOVEL CORONAVIRUS: Primary | ICD-10-CM

## 2022-11-30 PROCEDURE — 99213 OFFICE O/P EST LOW 20 MIN: CPT | Mod: 95 | Performed by: NURSE PRACTITIONER

## 2022-11-30 NOTE — PROGRESS NOTES
Tyler is a 52 year old who is being evaluated via a billable video visit.      How would you like to obtain your AVS? MyChart  If the video visit is dropped, the invitation should be resent by: Text to cell phone: [phone]  Will anyone else be joining your video visit? No          Assessment & Plan     Infection due to 2019 novel coronavirus  Current COVID-19 infection with symptom onset 11/28/2022.  Symptoms mild. Discussed antiviral therapy, risks versus benefits, high risk indications as well as side effects.  Patient does meet high risk indication and would like to start antiviral.  Encouraged to continue supportive cares as well including hydration, rest, elevating head of bed and Tylenol and/or ibuprofen as needed.  Reviewed quarantine guidelines including 5-day quarantine starting day after symptom onset followed by 5 days of mask wearing while returning to normal activities.  Follow-up in clinic if symptoms not proving or worsening.  - nirmatrelvir and ritonavir (PAXLOVID) therapy pack; Take 3 tablets by mouth 2 times daily for 5 days (Take 2 Nirmatrelvir tablets and 1 Ritonavir tablet twice daily for 5 days)             See Patient Instructions    Return in about 1 week (around 12/7/2022), or if symptoms worsen or fail to improve.    Shruthi Delacruz, ROSS, APRN-CNP   [facility]    Subjective   Tyler is a 52 year old, presenting for the following health issues:  Covid Concern      HPI       COVID-19 Symptom Review  How many days ago did these symptoms start? Since 11/28/2022, positive Home COVID test 11/30/2022    Are any of the following symptoms significant for you?    New or worsening difficulty breathing? No    Worsening cough? No    Fever or chills? Yes, I felt feverish or had chills.    Headache: YES    Sore throat: No, has a scratchy throat    Chest pain: No    Diarrhea: No    Body aches? No    Slight congestion, fatigue and slight dizzy    What treatments has  patient tried? Dayquil   Does patient live in a nursing home, group home, or shelter? No  Does patient have a way to get food/medications during quarantined? Yes, I have a friend or family member who can help me.                Review of Systems   Constitutional, HEENT, cardiovascular, pulmonary, gi and gu systems are negative, except as otherwise noted.      Objective           Vitals:  No vitals were obtained today due to virtual visit.    Physical Exam   GENERAL: Healthy, alert and no distress  EYES: Eyes grossly normal to inspection.  No discharge or erythema, or obvious scleral/conjunctival abnormalities.  RESP: No audible wheeze, cough, or visible cyanosis.  No visible retractions or increased work of breathing.    SKIN: Visible skin clear. No significant rash, abnormal pigmentation or lesions.  NEURO: Cranial nerves grossly intact.  Mentation and speech appropriate for age.  PSYCH: Mentation appears normal, affect normal/bright, judgement and insight intact, normal speech and appearance well-groomed.    Diagnostic Test Results:  none            Video-Visit Details    Video Start Time: 5:40 PM    Type of service:  Video Visit    Video End Time:5:49 PM    Originating Location (pt. Location): Home        Distant Location (provider location):  On-site    Platform used for Video Visit: Felipe

## 2022-11-30 NOTE — PATIENT INSTRUCTIONS
Infection due to 2019 novel coronavirus  Current COVID-19 infection with symptom onset 11/28/2022.  Symptoms mild. Discussed antiviral therapy, risks versus benefits, high risk indications as well as side effects.  Patient does meet high risk indication and would like to start antiviral.  Encouraged to continue supportive cares as well including hydration, rest, elevating head of bed and Tylenol and/or ibuprofen as needed.  Reviewed quarantine guidelines including 5-day quarantine starting day after symptom onset followed by 5 days of mask wearing while returning to normal activities.  Follow-up in clinic if symptoms not proving or worsening.  - nirmatrelvir and ritonavir (PAXLOVID) therapy pack; Take 3 tablets by mouth 2 times daily for 5 days (Take 2 Nirmatrelvir tablets and 1 Ritonavir tablet twice daily for 5 days)      Coping with Life After COVID-19  Being in the hospital because of COVID-19 is scary. Going home can be scary, too. You may face changes to your life, the way you work or what you can eat. It s hard to adjust to change, and it s normal to feel afraid, frustrated or even angry. These feelings usually go away over time. If your feelings don t start to get better, it s called  adjustment disorder.      What signs should I look out for?  Adjustment disorder can happen to anyone in a time of stress. It makes it hard to cope with daily life. You may feel lonely or fight with loved ones, even if you re glad to be home. Watch for these signs:  Fear or worry  Hard time focusing  Sadness or anger  Trouble talking to family or friends  Feeling like you don t fit in or isolating yourself  Problems with sleep   Drinking alcohol or taking drugs to cope    What can I do?  You can help yourself get better. Feeling you have control helps you move forward. You may wonder if you ll be able to do things you did before. Be patient. Do your best to make the most of every day. Try to build relationships, be as active as  you can, eat right and keep a sense of humor. Avoid smoking and drinking too much alcohol. Call your family doctor or clinic if you re not sure what to do. They can guide you to care or other services.    When should I get help?  Think about getting counseling if your sadness or frustration gets worse. Together with a trained counselor, you can talk about your problems adjusting to life after your hospital stay. You can come up with new ways to handle changes that give you more control. Your family doctor or care team can help you find a counselor.     Get help if you re thinking about hurting yourself. If you need help right away, call 911 or go to the nearest emergency room. You can also try the Crisis Text Line.    Crisis Text Line: 254-118 (http://www.crisistextline.org)  The Crisis Text Line serves anyone, in any crisis. It gives free, 24/7 support. Here's how it works:  Text HOME to 500299 from anywhere in the [place], anytime, about any type of crisis.  A live, trained Crisis Counselor will text you back quickly.  The volunteer Crisis Counselor can help you move from a  hot moment  to a  cool moment.  They can also help you work out a safety plan.

## 2022-12-14 DIAGNOSIS — G47.33 OSA (OBSTRUCTIVE SLEEP APNEA): Primary | ICD-10-CM

## 2022-12-20 ENCOUNTER — VIRTUAL VISIT (OUTPATIENT)
Dept: SLEEP MEDICINE | Facility: CLINIC | Age: 52
End: 2022-12-20
Payer: COMMERCIAL

## 2022-12-20 VITALS — BODY MASS INDEX: 31.07 KG/M2 | HEIGHT: 68 IN | WEIGHT: 205 LBS

## 2022-12-20 DIAGNOSIS — G47.33 OSA (OBSTRUCTIVE SLEEP APNEA): Primary | ICD-10-CM

## 2022-12-20 PROBLEM — E11.9 DIABETES MELLITUS, TYPE 2 (H): Status: ACTIVE | Noted: 2021-01-19

## 2022-12-20 PROCEDURE — 99203 OFFICE O/P NEW LOW 30 MIN: CPT | Mod: 95 | Performed by: INTERNAL MEDICINE

## 2022-12-20 ASSESSMENT — SLEEP AND FATIGUE QUESTIONNAIRES
HOW LIKELY ARE YOU TO NOD OFF OR FALL ASLEEP WHILE SITTING AND TALKING TO SOMEONE: WOULD NEVER DOZE
HOW LIKELY ARE YOU TO NOD OFF OR FALL ASLEEP WHILE SITTING AND READING: WOULD NEVER DOZE
HOW LIKELY ARE YOU TO NOD OFF OR FALL ASLEEP WHILE SITTING INACTIVE IN A PUBLIC PLACE: WOULD NEVER DOZE
HOW LIKELY ARE YOU TO NOD OFF OR FALL ASLEEP IN A CAR, WHILE STOPPED FOR A FEW MINUTES IN TRAFFIC: WOULD NEVER DOZE
HOW LIKELY ARE YOU TO NOD OFF OR FALL ASLEEP WHILE WATCHING TV: SLIGHT CHANCE OF DOZING
HOW LIKELY ARE YOU TO NOD OFF OR FALL ASLEEP WHILE LYING DOWN TO REST IN THE AFTERNOON WHEN CIRCUMSTANCES PERMIT: SLIGHT CHANCE OF DOZING
HOW LIKELY ARE YOU TO NOD OFF OR FALL ASLEEP WHEN YOU ARE A PASSENGER IN A CAR FOR AN HOUR WITHOUT A BREAK: SLIGHT CHANCE OF DOZING
HOW LIKELY ARE YOU TO NOD OFF OR FALL ASLEEP WHILE SITTING QUIETLY AFTER LUNCH WITHOUT ALCOHOL: WOULD NEVER DOZE

## 2022-12-20 NOTE — NURSING NOTE
DME order for supplies sent to [facility].  Patient to return in 2 years, sent patient a delayed [employer] Mobile message for a 2 year follow up appointment reminder.    Jean Marie Erickson CMA

## 2022-12-20 NOTE — PROGRESS NOTES
There you today Tyler is a 52 year old who is being evaluated via a billable video visit.      How would you like to obtain your AVS? MyChart  If the video visit is dropped, the invitation should be resent by: Text to cell phone: [phone]  Will anyone else be joining your video visit? Bruna  Ieshabeatrice Abbott        Video-Visit Details    Video Start Time: 2pm    Type of service:  Video Visit    Video End Time:2:27 PM    Originating Location (pt. Location): Home        Distant Location (provider location):  Off-site    Platform used for Video Visit: [facility]   Outpatient Sleep Medicine Follow-up Visit  December 20, 2022    Name: Tyler Turk MRN# [id number]   Age: 52 year old YOB: 1970     Date of Consultation: December 20, 2022  Consultation is requested by: No referring provider defined for this encounter.  Primary care provider: No Ref-Primary, Physician           Assessment and Plan:     Sleep Diagnoses:    Severe obstructive sleep apnea    Comorbid conditions:    Type 2 diabetes mellitus    Summary Recommendations:    Develop evening routine ending at approximately 10-11 PM with avoidance of alcohol within 2 hours of this and intentional movement to the bed with TV off at 10 PM.    Return to clinic in 2 years or earlier if you are experiencing difficulty initiating or maintaining sleep or have recurrence of snoring, sleep disruption or nonrestorative sleep.           History of Present Illness:     Tyler Turk is a 52 year old male with severe obstructive sleep apnea currently on auto titration 5-15 with insufficient usage average use 3 hours with  42% of time greater than 4 hours and over 7 hours on days used with residual AHI of less than 2 95th percentile deployed pressure 11 and no significant leak.  He acknowledges that the irregular use below is often due to irregular eating pattern with work into the evening as late as 7 PM and falling  asleep in front of the television sometimes on a nightly basis after alcohol.  We discussed the potential complications of untreated sleep apnea in the setting of his diabetes and cardiovascular risk as well as the negative impact of alcohol on sleep apnea particularly if he falls asleep without his CPAP.  He acknowledges some increased stress as result of his children leaving the household and the parents having to establish a routine.  I recommended a joint evening activity and cessation of alcohol within 2 to 3 hours of targeted bedtime of approximately 10:11 PM.            PREVIOUS HOME SLEEP TESTING:  DATE:10/26/2020  Analysis Time:  528.8 minutes     Respiration:   Sleep Associated Hypoxemia: sustained hypoxemia was present. Baseline oxygen saturation was 90.8%.  Time with saturation less than or equal to 88% was 80 minutes. The lowest oxygen saturation was 78%.   Snoring: Snoring was present.  Respiratory events: The home study revealed a presence of 208 obstructive apneas and 7 mixed and central apneas. There were 54 hypopneas resulting in a combined apnea/hypopnea index [AHI] of 30.5 events per hour.  AHI was 30.7 per hour supine, - per hour prone, 16.2 per hour on left side, and - per hour on right side.   Pattern: Excluding events noted above, respiratory rate and pattern was Normal.     Position: Percent of time spent: supine - 98.6%, prone - 0%, on left - 1.4%, on right - 0%.     Heart Rate: By pulse oximetry normal rate was noted.                   Medications:     Current Outpatient Medications   Medication Sig     blood-glucose meter Misc [BLOOD-GLUCOSE METER MISC] Please dispense Accu Check Guide meter +compatible  testing supplies OR any meter + compatible testing supplies per insurance formualry     generic lancets (ACCU-CHEK SOFTCLIX LANCETS) [GENERIC LANCETS (ACCU-CHEK SOFTCLIX LANCETS)] Test 2 times daily. Dispense brand per patient's insurance at pharmacy discretion.     metFORMIN (GLUCOPHAGE  XR) 500 MG 24 hr tablet TAKE 4 TABLET BY MOUTH DAILY. Strength: 500 mg     ONETOUCH VERIO IQ test strip TEST 2 TIMES DAILY     No current facility-administered medications for this visit.        No Known Allergies         Problem List:     Patient Active Problem List   Diagnosis     RONEY (obstructive sleep apnea)            Past Medical History:     Does not need 02 supplement at night   Past Medical History:   Diagnosis Date     Asthma     As a youth.     Rheumatic fever     As child     Seasonal allergies              Past Surgical History:    No h/o  upper airway surgery  Past Surgical History:   Procedure Laterality Date     MANDIBLE SURGERY                Physical Examination:   Objective:    Reported vitals:  There were no vitals taken for this visit.   GENERAL: Healthy, alert and no distress  EYES: Eyes grossly normal to inspection.  No discharge or erythema, or obvious scleral/conjunctival abnormalities.  RESP: No audible wheeze, cough, or visible cyanosis.  No visible retractions or increased work of breathing.    SKIN: Visible skin clear. No significant rash, abnormal pigmentation or lesions.  NEURO: Cranial nerves grossly intact.  Mentation and speech appropriate for age.  PSYCH: Mentation appears normal, affect normal/bright, judgement and insight intact, normal speech and appearance well-groomed.        Copy to: No Ref-Primary, Physician      JAVIER LARES MD 12/20/2022         Total time spent reviewing medical records including previous testing and interpretation as well as direct patient contact and documentation on this date: 30 minutes

## 2023-01-10 ENCOUNTER — TRANSFERRED RECORDS (OUTPATIENT)
Dept: MULTI SPECIALTY CLINIC | Facility: CLINIC | Age: 53
End: 2023-01-10
Payer: COMMERCIAL

## 2023-01-10 LAB — RETINOPATHY: NORMAL

## 2023-01-12 ENCOUNTER — TRANSFERRED RECORDS (OUTPATIENT)
Dept: HEALTH INFORMATION MANAGEMENT | Facility: CLINIC | Age: 53
End: 2023-01-12

## 2023-01-12 LAB — RETINOPATHY: NEGATIVE

## 2023-01-25 ASSESSMENT — ENCOUNTER SYMPTOMS
FEVER: 0
DIZZINESS: 0
PARESTHESIAS: 0
COUGH: 0
WEAKNESS: 0
CHILLS: 0
FREQUENCY: 0
ABDOMINAL PAIN: 0
DYSURIA: 0
SHORTNESS OF BREATH: 0
CONSTIPATION: 0
HEMATOCHEZIA: 0
HEADACHES: 0
MYALGIAS: 0
HEARTBURN: 0
ARTHRALGIAS: 0
NERVOUS/ANXIOUS: 0
DIARRHEA: 0
PALPITATIONS: 0
HEMATURIA: 0
NAUSEA: 0
SORE THROAT: 0
JOINT SWELLING: 0
EYE PAIN: 0

## 2023-01-31 ENCOUNTER — OFFICE VISIT (OUTPATIENT)
Dept: FAMILY MEDICINE | Facility: CLINIC | Age: 53
End: 2023-01-31
Payer: COMMERCIAL

## 2023-01-31 VITALS
SYSTOLIC BLOOD PRESSURE: 126 MMHG | BODY MASS INDEX: 31.37 KG/M2 | HEIGHT: 68 IN | WEIGHT: 207 LBS | DIASTOLIC BLOOD PRESSURE: 82 MMHG | TEMPERATURE: 99 F | OXYGEN SATURATION: 98 % | RESPIRATION RATE: 12 BRPM | HEART RATE: 81 BPM

## 2023-01-31 DIAGNOSIS — G47.33 OSA (OBSTRUCTIVE SLEEP APNEA): ICD-10-CM

## 2023-01-31 DIAGNOSIS — E11.9 TYPE 2 DIABETES MELLITUS WITHOUT COMPLICATION, WITHOUT LONG-TERM CURRENT USE OF INSULIN (H): ICD-10-CM

## 2023-01-31 DIAGNOSIS — Z00.00 ROUTINE GENERAL MEDICAL EXAMINATION AT A HEALTH CARE FACILITY: Primary | ICD-10-CM

## 2023-01-31 DIAGNOSIS — Z12.5 SCREENING FOR PROSTATE CANCER: ICD-10-CM

## 2023-01-31 PROCEDURE — 90677 PCV20 VACCINE IM: CPT | Performed by: NURSE PRACTITIONER

## 2023-01-31 PROCEDURE — 90471 IMMUNIZATION ADMIN: CPT | Performed by: NURSE PRACTITIONER

## 2023-01-31 PROCEDURE — 99396 PREV VISIT EST AGE 40-64: CPT | Mod: 25 | Performed by: NURSE PRACTITIONER

## 2023-01-31 RX ORDER — METFORMIN HCL 500 MG
TABLET, EXTENDED RELEASE 24 HR ORAL
Qty: 360 TABLET | Refills: 3 | Status: SHIPPED | OUTPATIENT
Start: 2023-01-31 | End: 2024-02-28

## 2023-01-31 ASSESSMENT — ENCOUNTER SYMPTOMS
PALPITATIONS: 0
HEARTBURN: 0
FEVER: 0
EYE PAIN: 0
JOINT SWELLING: 0
SHORTNESS OF BREATH: 0
NERVOUS/ANXIOUS: 0
WEAKNESS: 0
COUGH: 0
FREQUENCY: 0
NAUSEA: 0
MYALGIAS: 0
CHILLS: 0
SORE THROAT: 0
ABDOMINAL PAIN: 0
ARTHRALGIAS: 0
DIARRHEA: 0
HEMATURIA: 0
HEADACHES: 0
CONSTIPATION: 0
HEMATOCHEZIA: 0
DYSURIA: 0
DIZZINESS: 0
PARESTHESIAS: 0

## 2023-01-31 NOTE — Clinical Note
Please abstract the following data from this visit with this patient into the appropriate field in Epic:  Tests that can be patient reported without a hard copy:  Eye exam with ophthalmology on this date: 01/10/2023 Exam Location: [facility]  Other Tests found in the patient's chart through Chart Review/Care Everywhere:  Eye exam with ophthalmology done by this group [facility] on this date: 01/10/2023  Note to Abstraction: If this section is blank, no results were found via Chart Review/Care Everywhere.

## 2023-01-31 NOTE — PROGRESS NOTES
SUBJECTIVE:   CC: Tyler is an 52 year old who presents for preventative health visit.   - has not been getting regular exercise. He is considering joining a gym. Has two grown kids. 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     Patient has been advised of split billing requirements and indicates understanding: Yes  Healthy Habits:     Getting at least 3 servings of Calcium per day:  NO    Bi-annual eye exam:  Yes    Dental care twice a year:  Yes    Sleep apnea or symptoms of sleep apnea:  Sleep apnea    Diet:  Diabetic    Frequency of exercise:  2-3 days/week    Duration of exercise:  Less than 15 minutes    Taking medications regularly:  Yes    Medication side effects:  None    PHQ-2 Total Score: 0    Additional concerns today:  No      Today's PHQ-2 Score:   PHQ-2 ( 1999 Pfizer) 1/25/2023   Q1: Little interest or pleasure in doing things 0   Q2: Feeling down, depressed or hopeless 0   PHQ-2 Score 0   PHQ-2 Total Score (12-17 Years)- Positive if 3 or more points; Administer PHQ-A if positive -   Q1: Little interest or pleasure in doing things Not at all   Q2: Feeling down, depressed or hopeless Not at all   PHQ-2 Score 0       Social History     Tobacco Use     Smoking status: Never     Smokeless tobacco: Never   Substance Use Topics     Alcohol use: Yes     If you drink alcohol do you typically have >3 drinks per day or >7 drinks per week? No    Alcohol Use 1/31/2023   Prescreen: >3 drinks/day or >7 drinks/week? -   Prescreen: >3 drinks/day or >7 drinks/week? No       Last PSA:   Prostate Specific Antigen Screen   Date Value Ref Range Status   07/27/2020 0.7 0.0 - 3.5 ng/mL Final       Reviewed orders with patient. Reviewed health maintenance and updated orders accordingly - Yes  Labs reviewed in EPIC    Reviewed and updated as needed this visit by clinical staff   Tobacco  Allergies  Meds              Reviewed and updated as needed this visit by Provider                 Past Medical History:   Diagnosis Date      "Asthma     As a youth.     Diabetes (H)      Rheumatic fever     As child     Seasonal allergies       Past Surgical History:   Procedure Laterality Date     MANDIBLE SURGERY         Review of Systems   Constitutional: Negative for chills and fever.   HENT: Negative for congestion, ear pain, hearing loss and sore throat.    Eyes: Negative for pain and visual disturbance.   Respiratory: Negative for cough and shortness of breath.    Cardiovascular: Negative for chest pain, palpitations and peripheral edema.   Gastrointestinal: Negative for abdominal pain, constipation, diarrhea, heartburn, hematochezia and nausea.   Genitourinary: Negative for dysuria, frequency, genital sores, hematuria, impotence, penile discharge and urgency.   Musculoskeletal: Negative for arthralgias, joint swelling and myalgias.   Skin: Negative for rash.   Neurological: Negative for dizziness, weakness, headaches and paresthesias.   Psychiatric/Behavioral: Negative for mood changes. The patient is not nervous/anxious.          OBJECTIVE:   /82   Pulse 81   Temp 99  F (37.2  C) (Oral)   Resp 12   Ht 1.727 m (5' 8\")   Wt 93.9 kg (207 lb)   SpO2 98%   BMI 31.47 kg/m      Physical Exam  GENERAL: healthy, alert and no distress  EYES: Eyes grossly normal to inspection, PERRL and conjunctivae and sclerae normal  HENT: ear canals and TM's normal, nose and mouth without ulcers or lesions  NECK: no adenopathy, no asymmetry, masses, or scars and thyroid normal to palpation  RESP: lungs clear to auscultation - no rales, rhonchi or wheezes  CV: regular rate and rhythm, normal S1 S2, no S3 or S4, no murmur, click or rub, no peripheral edema and peripheral pulses strong  ABDOMEN: soft, nontender, no hepatosplenomegaly, no masses and bowel sounds normal  MS: no gross musculoskeletal defects noted, no edema  SKIN: no suspicious lesions or rashes  NEURO: Normal strength and tone, mentation intact and speech normal  PSYCH: mentation appears normal, " "affect normal/bright  LYMPH: no cervical, supraclavicular, axillary, or inguinal adenopathy    Diagnostic Test Results:  Labs reviewed in Epic    ASSESSMENT/PLAN:       ICD-10-CM    1. Routine general medical examination at a health care facility  Z00.00       2. Type 2 diabetes mellitus without complication, without long-term current use of insulin (H)  E11.9 Hemoglobin A1c     metFORMIN (GLUCOPHAGE XR) 500 MG 24 hr tablet      3. Screening for prostate cancer  Z12.5 PSA, screen      4. RONEY (obstructive sleep apnea)  G47.33         - using CPAP, was 2-3 nights per week, now better at 5 nights per week. He is aware to try and use it nightly.   - DM discussed. He can continue Metformin, I mention GLP-1 and he declined for now.   - lipids stable. I discussed ways to improve the HDL. His ASCVD is 7.6%; he will continue to work on lifestyle changes, and we will recheck this value next year.   The 10-year ASCVD risk score (Evette RENE, et al., 2019) is: 7.6%    Values used to calculate the score:      Age: 52 years      Sex: Male      Is Non- : No      Diabetic: Yes      Tobacco smoker: No      Systolic Blood Pressure: 126 mmHg      Is BP treated: No      HDL Cholesterol: 38 mg/dL      Total Cholesterol: 147 mg/dL    - spent time looking at ways to get more exercise.     Patient has been advised of split billing requirements and indicates understanding: Yes      COUNSELING:   Reviewed preventive health counseling, as reflected in patient instructions      BMI:   Estimated body mass index is 31.17 kg/m  as calculated from the following:    Height as of 12/20/22: 1.727 m (5' 8\").    Weight as of 12/20/22: 93 kg (205 lb).   Weight management plan: Discussed healthy diet and exercise guidelines      He reports that he has never smoked. He has never used smokeless tobacco.            MODE Ruelas CNP  [facility]"

## 2023-02-06 ENCOUNTER — VIRTUAL VISIT (OUTPATIENT)
Dept: EDUCATION SERVICES | Facility: CLINIC | Age: 53
End: 2023-02-06
Payer: COMMERCIAL

## 2023-02-06 DIAGNOSIS — E11.9 DIABETES MELLITUS (H): Primary | ICD-10-CM

## 2023-02-06 PROCEDURE — G0108 DIAB MANAGE TRN  PER INDIV: HCPCS | Mod: AE | Performed by: DIETITIAN, REGISTERED

## 2023-02-06 NOTE — PROGRESS NOTES
Diabetes Self-Management Education & Support    Presents for:      Type of Service: Telephone Visit    Originating Location (Patient Location): Home  Distant Location (Provider Location): Home  Mode of Communication:  Telephone    Assessment Type:   ASSESSMENT:  Follow up diabetes education visit.  A1C:6.8 (22) > 6.3 (22)  Limited SMBG data indicate glycemic control is headed in the right direction  Pt has been trying to eat much healthier and notes how much better he feels.     Taking 2000 mg metformin daily with SE.   His goal has been to go off his diabetes meds - reviewed that we will continue to assess.    SMBG:  Continues to test ~ 1x/d.   FBG typically ranges 97 - 120     Drinking more water now but also continues to drink ~ 2 cans of soda/wk, especially when he eats out.  Does not like to drink plain water. We dicussed healthy ways to add flavor to water/infusing it with herbs, lime/lemon etc     Wt has been steady  Ranges 200 - 205 lb     Elbow injury - from snow shoveling, but is slowly getting better. Hence not as consistent with exercise.     No other concerns today.     Patient's most recent   Lab Results   Component Value Date    A1C 6.8 2022     is meeting goal of <7    Diabetes knowledge and skills assessment:   Patient is knowledgeable in diabetes management concepts related to: Monitoring    Continue education with the following diabetes management concepts: Problem Solving and Reducing Risks    Based on learning assessment above, most appropriate setting for further diabetes education would be: Individual setting.      PLAN    Recommend no change to the current therapy.     Pt to focus on dietary changes and regular activity, as able/safe.     To quit drinking soda and continue to drink more water instead.     Pt to continue to test BG 1x/d, at different times and keep a BG log for review.  FB-130         2 hr Post Meal:      Topics to cover at upcoming visits: problem  "solving, reducing risks    Follow-up: 2-3 months  Pt will call to schedule A1C draw for March - I am hoping it will come back improved    See Care Plan for co-developed, patient-state behavior change goals.  AVS provided for patient today.    SUBJECTIVE/OBJECTIVE:     Cultural Influences/Ethnic Background:  Not  or     Diabetes Symptoms & Complications:     Patient Problem List and Family Medical History reviewed for relevant medical history, current medical status, and diabetes risk factors.    Vitals:  There were no vitals taken for this visit.  Estimated body mass index is 31.47 kg/m  as calculated from the following:    Height as of 1/31/23: 1.727 m (5' 8\").    Weight as of 1/31/23: 93.9 kg (207 lb).   Last 3 BP:   BP Readings from Last 3 Encounters:   01/31/23 126/82   01/26/22 110/80   07/28/21 100/76       History   Smoking Status     Never   Smokeless Tobacco     Never       Labs:  Lab Results   Component Value Date    A1C 6.8 11/18/2022     Lab Results   Component Value Date     11/18/2022     02/18/2021     Lab Results   Component Value Date    LDL 85 11/18/2022     Direct Measure HDL   Date Value Ref Range Status   11/18/2022 38 (L) >=40 mg/dL Final   ]  GFR Estimate   Date Value Ref Range Status   11/18/2022 72 >60 mL/min/1.73m2 Final     Comment:     Effective December 21, 2021 eGFRcr in adults is calculated using the 2021 CKD-EPI creatinine equation which includes age and gender (Tanvi seo al., NEJ, DOI: 10.1056/GADCaz7038640)   02/18/2021 >60 >60 mL/min/1.73m2 Final     GFR Estimate If Black   Date Value Ref Range Status   02/18/2021 >60 >60 mL/min/1.73m2 Final     Lab Results   Component Value Date    CR 1.20 11/18/2022     No results found for: MICROALBUMIN    Taking Medications:  Diabetes Medication(s)     Biguanides       metFORMIN (GLUCOPHAGE XR) 500 MG 24 hr tablet    TAKE 4 TABLET BY MOUTH DAILY. Strength: 500 mg          Patient Activation Measure Survey Score:  No " flowsheet data found.    Care Plan and Education Provided:  AADE 7 review     Time Spent: 50 minutes  Encounter Type: Individual    Any diabetes medication dose changes were made via the CDE Protocol per the patient's referring provider. A copy of this encounter was shared with the provider.

## 2023-05-08 ENCOUNTER — HEALTH MAINTENANCE LETTER (OUTPATIENT)
Age: 53
End: 2023-05-08

## 2023-10-14 ENCOUNTER — HEALTH MAINTENANCE LETTER (OUTPATIENT)
Age: 53
End: 2023-10-14

## 2024-01-04 ENCOUNTER — PATIENT OUTREACH (OUTPATIENT)
Dept: CARE COORDINATION | Facility: CLINIC | Age: 54
End: 2024-01-04

## 2024-01-18 ENCOUNTER — PATIENT OUTREACH (OUTPATIENT)
Dept: CARE COORDINATION | Facility: CLINIC | Age: 54
End: 2024-01-18

## 2024-02-25 ENCOUNTER — HEALTH MAINTENANCE LETTER (OUTPATIENT)
Age: 54
End: 2024-02-25

## 2024-02-27 ENCOUNTER — TELEPHONE (OUTPATIENT)
Dept: SLEEP MEDICINE | Facility: CLINIC | Age: 54
End: 2024-02-27
Payer: COMMERCIAL

## 2024-02-27 ENCOUNTER — DOCUMENTATION ONLY (OUTPATIENT)
Dept: FAMILY MEDICINE | Facility: CLINIC | Age: 54
End: 2024-02-27
Payer: COMMERCIAL

## 2024-02-27 DIAGNOSIS — G47.33 OSA (OBSTRUCTIVE SLEEP APNEA): Primary | ICD-10-CM

## 2024-02-27 NOTE — TELEPHONE ENCOUNTER
Reason for Call:  Other order    Detailed comments: patient called and needs an order for [facility] only [facility]     Please contact patient.  Thank you.    Phone Number Patient can be reached at: Cell number on file:    Telephone Information:   Mobile [phone]       Best Time: any    Can we leave a detailed message on this number? YES    Call taken on 2/27/2024 at 9:20 AM by Chel Edge

## 2024-02-27 NOTE — TELEPHONE ENCOUNTER
Last ov 12/20/22  Next ov none-due 12/2024    Patient requesting updated order for CPAP supplies prior to appointment. Order pended and routed to provider for consideration.    Uses [facility]    Radha CRUZ RN  [facility]

## 2024-02-28 DIAGNOSIS — E11.9 TYPE 2 DIABETES MELLITUS WITHOUT COMPLICATION, WITHOUT LONG-TERM CURRENT USE OF INSULIN (H): ICD-10-CM

## 2024-02-28 RX ORDER — METFORMIN HCL 500 MG
TABLET, EXTENDED RELEASE 24 HR ORAL
Qty: 360 TABLET | Refills: 0 | Status: SHIPPED | OUTPATIENT
Start: 2024-02-28 | End: 2024-04-11

## 2024-02-28 NOTE — TELEPHONE ENCOUNTER
Medication Question or Refill        What medication are you calling about (include dose and sig)?: metFORMIN (GLUCOPHAGE XR) 500 MG 24 hr tablet     Preferred Pharmacy:   [facility]/pharmacy #[id number] - [place], [place] - [address]  [address] [address]  Phone: [phone] Fax: [phone]      Controlled Substance Agreement on file:   CSA -- Patient Level:    CSA: None found at the patient level.       Who prescribed the medication?: Hillary Swain    Do you need a refill? Yes    Patient offered an appointment? Yes: pt has an appt on 4/8/2024     Do you have any questions or concerns?  Yes: pt asking for bridge in Rx till scheduled OV      [facility] we [facility] you [facility] or would you prefer to receive a phone call?:   Patient would prefer a phone call   Okay to leave a detailed message?: Yes at Cell number on file:    Telephone Information:   Mobile [phone]

## 2024-03-05 ENCOUNTER — DOCUMENTATION ONLY (OUTPATIENT)
Dept: FAMILY MEDICINE | Facility: CLINIC | Age: 54
End: 2024-03-05
Payer: COMMERCIAL

## 2024-03-05 NOTE — TELEPHONE ENCOUNTER
I am not sure. I saw him over one year ago. Could call the patient and clarify.     MODE Ruelas CNP

## 2024-03-07 ENCOUNTER — VIRTUAL VISIT (OUTPATIENT)
Dept: EDUCATION SERVICES | Facility: CLINIC | Age: 54
End: 2024-03-07
Payer: COMMERCIAL

## 2024-03-07 DIAGNOSIS — E11.9 DIABETES MELLITUS (H): Primary | ICD-10-CM

## 2024-03-07 PROCEDURE — G0108 DIAB MANAGE TRN  PER INDIV: HCPCS | Mod: 93 | Performed by: DIETITIAN, REGISTERED

## 2024-03-07 NOTE — PROGRESS NOTES
Diabetes Self-Management Education & Support    Presents for:      Type of Service: Telephone Visit    ASSESSMENT:  Follow up diabetes education visit.  Most recent A1C:6.8 (11/18/22) - pt is due for redraw  BS have been elevated (per the limited SMBG data) - pt is trying to get back on track with eating healthfully and regular physical activity.     Currently taking 2000 mg metformin daily.      SMBG:  Tests~ 1-2x/d, at different times  Per report, BG ranged 160 - 222 in the past 2 weeks,  Also had a BS of 72 a week ago, felt lightheaded - was fasting the day before. Ate and felt better.  We reviewed the importance of regular meals with consistent CHO and NOT skipping/delaying meals/snacks.    Has been trying to eat at home more regularly now.  Drinking more water now. Uses a powder with electrolytes (called Drip Drop) to add to his water.   No more soda, does diet soda and unsweetened tea.  Reviewed reading nutrition facts/labels to be able to make the right choices and also ways to add flavor/infuse water at home.     Wt has been steady.Ranges 195 - 203 lb     Trying to workout about 5x/wk.  Is almost recovered from his elbow injury.     No other concerns today.    Patient's most recent   Lab Results   Component Value Date    A1C 6.8 11/18/2022     is meeting goal of <7.0    Based on learning assessment above, most appropriate setting for further diabetes education would be: Individual setting.      PLAN    No changes recommended to current therapy.  Continue watching CHO foods and portions  Encouraged pt to read nutrition facts/labels to be able to make the right choices and also discussed ways to add flavor/infuse water at home.  Regular physical activity as able/safe.    Topics to cover at upcoming visits: Healthy Eating, Problem Solving, and Reducing Risks    Follow-up: 6-8 weeks (or sooner if concerns)  PCP:4/8    See Care Plan for co-developed, patient-state behavior change goals.  AVS provided for patient  "today.    SUBJECTIVE/OBJECTIVE:     Cultural Influences/Ethnic Background:  Not  or     Patient Problem List and Family Medical History reviewed for relevant medical history, current medical status, and diabetes risk factors.    Vitals:  There were no vitals taken for this visit.  Estimated body mass index is 31.47 kg/m  as calculated from the following:    Height as of 1/31/23: 1.727 m (5' 8\").    Weight as of 1/31/23: 93.9 kg (207 lb).   Last 3 BP:   BP Readings from Last 3 Encounters:   01/31/23 126/82   01/26/22 110/80   07/28/21 100/76       History   Smoking Status    Never   Smokeless Tobacco    Never       Labs:  Lab Results   Component Value Date    A1C 6.8 11/18/2022     Lab Results   Component Value Date     11/18/2022     02/18/2021     Lab Results   Component Value Date    LDL 85 11/18/2022     Direct Measure HDL   Date Value Ref Range Status   11/18/2022 38 (L) >=40 mg/dL Final   ]  GFR Estimate   Date Value Ref Range Status   11/18/2022 72 >60 mL/min/1.73m2 Final     Comment:     Effective December 21, 2021 eGFRcr in adults is calculated using the 2021 CKD-EPI creatinine equation which includes age and gender (Tanvi et al., NE, DOI: 10.1056/HMYNyp2204005)   02/18/2021 >60 >60 mL/min/1.73m2 Final     GFR Estimate If Black   Date Value Ref Range Status   02/18/2021 >60 >60 mL/min/1.73m2 Final     Lab Results   Component Value Date    CR 1.20 11/18/2022     No results found for: \"MICROALBUMIN\"    Taking Medications:  Diabetes Medication(s)       Biguanides       metFORMIN (GLUCOPHAGE XR) 500 MG 24 hr tablet TAKE 4 TABLET BY MOUTH DAILY. Strength: 500 mg             Patient Activation Measure Survey Score:       No data to display                Time Spent: 50 minutes  Encounter Type: Individual    Any diabetes medication dose changes were made via the CDE Protocol per the patient's referring provider. A copy of this encounter was shared with the provider.    "

## 2024-03-07 NOTE — LETTER
3/7/2024         RE: Tyler Turk  [address] [address]        Dear Colleague,    Thank you for referring your patient, Tyler Turk, to the [facility]. Please see a copy of my visit note below.    Diabetes Self-Management Education & Support    Presents for:      Type of Service: Telephone Visit    ASSESSMENT:  Follow up diabetes education visit.  Most recent A1C:6.8 (11/18/22) - pt is due for redraw  BS have been elevated (per the limited SMBG data) - pt is trying to get back on track with eating healthfully and regular physical activity.     Currently taking 2000 mg metformin daily.      SMBG:  Tests~ 1-2x/d, at different times  Per report, BG ranged 160 - 222 in the past 2 weeks,  Also had a BS of 72 a week ago, felt lightheaded - was fasting the day before. Ate and felt better.  We reviewed the importance of regular meals with consistent CHO and NOT skipping/delaying meals/snacks.    Has been trying to eat at home more regularly now.  Drinking more water now. Uses a powder with electrolytes (called Drip Drop) to add to his water.   No more soda, does diet soda and unsweetened tea.  Reviewed reading nutrition facts/labels to be able to make the right choices and also ways to add flavor/infuse water at home.     Wt has been steady.Ranges 195 - 203 lb     Trying to workout about 5x/wk.  Is almost recovered from his elbow injury.     No other concerns today.    Patient's most recent   Lab Results   Component Value Date    A1C 6.8 11/18/2022     is meeting goal of <7.0    Based on learning assessment above, most appropriate setting for further diabetes education would be: Individual setting.      PLAN    No changes recommended to current therapy.  Continue watching CHO foods and portions  Encouraged pt to read nutrition facts/labels to be able to make the right choices and also discussed ways to add flavor/infuse water at home.  Regular physical activity as  "able/safe.    Topics to cover at upcoming visits: Healthy Eating, Problem Solving, and Reducing Risks    Follow-up: 6-8 weeks (or sooner if concerns)  PCP:4/8    See Care Plan for co-developed, patient-state behavior change goals.  AVS provided for patient today.    SUBJECTIVE/OBJECTIVE:     Cultural Influences/Ethnic Background:  Not  or     Patient Problem List and Family Medical History reviewed for relevant medical history, current medical status, and diabetes risk factors.    Vitals:  There were no vitals taken for this visit.  Estimated body mass index is 31.47 kg/m  as calculated from the following:    Height as of 1/31/23: 1.727 m (5' 8\").    Weight as of 1/31/23: 93.9 kg (207 lb).   Last 3 BP:   BP Readings from Last 3 Encounters:   01/31/23 126/82   01/26/22 110/80   07/28/21 100/76       History   Smoking Status     Never   Smokeless Tobacco     Never       Labs:  Lab Results   Component Value Date    A1C 6.8 11/18/2022     Lab Results   Component Value Date     11/18/2022     02/18/2021     Lab Results   Component Value Date    LDL 85 11/18/2022     Direct Measure HDL   Date Value Ref Range Status   11/18/2022 38 (L) >=40 mg/dL Final   ]  GFR Estimate   Date Value Ref Range Status   11/18/2022 72 >60 mL/min/1.73m2 Final     Comment:     Effective December 21, 2021 eGFRcr in adults is calculated using the 2021 CKD-EPI creatinine equation which includes age and gender (Tanvi seo al., NEJ, DOI: 10.1056/UYOLlt9624504)   02/18/2021 >60 >60 mL/min/1.73m2 Final     GFR Estimate If Black   Date Value Ref Range Status   02/18/2021 >60 >60 mL/min/1.73m2 Final     Lab Results   Component Value Date    CR 1.20 11/18/2022     No results found for: \"MICROALBUMIN\"    Taking Medications:  Diabetes Medication(s)       Biguanides       metFORMIN (GLUCOPHAGE XR) 500 MG 24 hr tablet TAKE 4 TABLET BY MOUTH DAILY. Strength: 500 mg             Patient Activation Measure Survey Score:       No data " to display                Time Spent: 50 minutes  Encounter Type: Individual    Any diabetes medication dose changes were made via the CDE Protocol per the patient's referring provider. A copy of this encounter was shared with the provider.

## 2024-03-07 NOTE — PATIENT INSTRUCTIONS
Patient instructions:    Continue with current therapy.  Continue watching CHO foods and portions  Read nutrition facts/labels to be able to make the right choices and explore ways to add flavor/infuse water at home.  Regular physical activity as able/safe.    Call/mychart with any questions.    Thanks  Autumn Nickerson RDN, YUE, [facility] [place]  Diabetes Care and Education

## 2024-04-07 SDOH — HEALTH STABILITY: PHYSICAL HEALTH: ON AVERAGE, HOW MANY DAYS PER WEEK DO YOU ENGAGE IN MODERATE TO STRENUOUS EXERCISE (LIKE A BRISK WALK)?: 3 DAYS

## 2024-04-07 SDOH — HEALTH STABILITY: PHYSICAL HEALTH: ON AVERAGE, HOW MANY MINUTES DO YOU ENGAGE IN EXERCISE AT THIS LEVEL?: 10 MIN

## 2024-04-07 ASSESSMENT — SOCIAL DETERMINANTS OF HEALTH (SDOH): HOW OFTEN DO YOU GET TOGETHER WITH FRIENDS OR RELATIVES?: ONCE A WEEK

## 2024-04-08 ENCOUNTER — OFFICE VISIT (OUTPATIENT)
Dept: FAMILY MEDICINE | Facility: CLINIC | Age: 54
End: 2024-04-08
Payer: COMMERCIAL

## 2024-04-08 ENCOUNTER — TRANSFERRED RECORDS (OUTPATIENT)
Dept: MULTI SPECIALTY CLINIC | Facility: CLINIC | Age: 54
End: 2024-04-08

## 2024-04-08 VITALS
TEMPERATURE: 97.8 F | SYSTOLIC BLOOD PRESSURE: 134 MMHG | HEIGHT: 68 IN | HEART RATE: 82 BPM | DIASTOLIC BLOOD PRESSURE: 88 MMHG | RESPIRATION RATE: 14 BRPM | OXYGEN SATURATION: 96 % | BODY MASS INDEX: 30.92 KG/M2 | WEIGHT: 204 LBS

## 2024-04-08 DIAGNOSIS — Z00.00 ROUTINE GENERAL MEDICAL EXAMINATION AT A HEALTH CARE FACILITY: Primary | ICD-10-CM

## 2024-04-08 DIAGNOSIS — Z12.5 SCREENING FOR PROSTATE CANCER: ICD-10-CM

## 2024-04-08 DIAGNOSIS — Z11.4 SCREENING FOR HIV (HUMAN IMMUNODEFICIENCY VIRUS): ICD-10-CM

## 2024-04-08 DIAGNOSIS — E11.9 TYPE 2 DIABETES MELLITUS WITHOUT COMPLICATION, WITHOUT LONG-TERM CURRENT USE OF INSULIN (H): ICD-10-CM

## 2024-04-08 DIAGNOSIS — G47.33 OSA (OBSTRUCTIVE SLEEP APNEA): ICD-10-CM

## 2024-04-08 LAB
HBA1C MFR BLD: 7.9 % (ref 0–5.6)
RETINOPATHY: NORMAL

## 2024-04-08 PROCEDURE — 87389 HIV-1 AG W/HIV-1&-2 AB AG IA: CPT | Performed by: NURSE PRACTITIONER

## 2024-04-08 PROCEDURE — 36415 COLL VENOUS BLD VENIPUNCTURE: CPT | Performed by: NURSE PRACTITIONER

## 2024-04-08 PROCEDURE — 82043 UR ALBUMIN QUANTITATIVE: CPT | Performed by: NURSE PRACTITIONER

## 2024-04-08 PROCEDURE — 83036 HEMOGLOBIN GLYCOSYLATED A1C: CPT | Performed by: NURSE PRACTITIONER

## 2024-04-08 PROCEDURE — 82570 ASSAY OF URINE CREATININE: CPT | Performed by: NURSE PRACTITIONER

## 2024-04-08 PROCEDURE — 99396 PREV VISIT EST AGE 40-64: CPT | Performed by: NURSE PRACTITIONER

## 2024-04-08 PROCEDURE — 80061 LIPID PANEL: CPT | Performed by: NURSE PRACTITIONER

## 2024-04-08 PROCEDURE — 80048 BASIC METABOLIC PNL TOTAL CA: CPT | Performed by: NURSE PRACTITIONER

## 2024-04-08 PROCEDURE — G0103 PSA SCREENING: HCPCS | Performed by: NURSE PRACTITIONER

## 2024-04-08 PROCEDURE — 99213 OFFICE O/P EST LOW 20 MIN: CPT | Mod: 25 | Performed by: NURSE PRACTITIONER

## 2024-04-08 NOTE — PATIENT INSTRUCTIONS
Insulin Resistance: what is it?   Insulin Resistance is a condition in which your cells can no longer successfully use insulin as a hormone to escort the sugar from your food into your cells. When your cells are unable to use glucose for fuel, not only will you experience a depletion of energy but they will store the unused glucose as fat. This can cause metabolic syndrome and some symptoms are: Obesity, High Blood pressure, High triglycerides, High blood sugars, or/and low HDL cholesterol.     How is insulin suppose to work?   Insulin is your sugar storing hormone.  Your pancreas releases this hormone after you eat a meal to escort the sugar from the meal into your cells.  Blood work sugar dense a meal, the greater the insulin release.   Sent influx of insulin will flood your cells, ultimately the receptor sites that along with the hormone to performed its job.  Receptor sites are guthrie on the outside of our cells that opened up to let hormones in.  The more insulin that  floods these guthrie, the more congested these guthrie become.  It is like a big cellular traffic jam.  Your cells will stop responding to insulin, both excess insulin and sugar will be stored as fat.     Other things that will spike your insulin is high cortisol levels.    Cortisol levels increased when you are to state of  stress, overworked, over exercising,and can occur with calorie restriction diets.  This can cause a fight or flight reaction in your brain, and your brain response by releasing cortisol into your bloodstream.  Your body will  respond by showing down digestion, holding fat burning, and rising glucose levels.  As your glucose levels increase, insulin rises to meet the sugar demands.  Once more, disorders of insulin overwhelms your cells.  Repetitive cortisol spikes will impede any type of dieting results.   For women,  to female body is much more sensitive to swings in  stress than a man's body.  High cortisol can cause our sex  hormones to drop and insulin to rise.     Can toxins obesity?  Toxins can make your weight increased.  These fat inducing chemicals are called obesogens.  When your body gets  an influx of toxins,  it does not know how to break them down, so it is stored as fat.  For example, monosodium glutamate and soy protein isolates are common in weight loss shakes.      Low inflammatory diet   1. Avoid foods that raise the blood sugar or insulin  - sweets  - starches  - artificial sweeteners   - gluten and whey protein    2. 12+ hour fast overnight starting 3 hours or more prior to bedtime, starting 7 pm or earlier  3. Portion control   4. Plant based diet   5. Daily exercise   6. Sleep 8 hours a night  7. Mental calming exercises daily  - Yoga, Meditation, Ashish Chi, Chi Gong, Journaling, Breathing Exercise    Lower Blood Sugars:  1. Minimize Glycemic Foods or Blood Sugar Elevating Foods  - sweets, juices, dried fruits, soda  - candy, cakes, cookies, ice cream, pastries   - flour foods such as breads, bagels, pizza crust, pasta, cereals, instant oatmeal, pancakes, waffles    2. Minimize Insulin Elevating Foods  - Gluten, a grain protein found in wheat, barley and rye  - Whey, a dairy protein found in protein supplements, milk and ice cream    3. Avoid Substances that Induce Insulin Resistance  - Sucralose or Splenda  - Aspartame or Nutrasweet  - Saccharine   4. Regular Exercise   5. Weight Control   6. Sleep 8 hours a night

## 2024-04-08 NOTE — PROGRESS NOTES
Preventive Care Visit  [facility]  MODE Ruelas CNP, Family Medicine  Apr 8, 2024      Assessment & Plan     Screening for HIV (human immunodeficiency virus)  *  - HIV Antigen Antibody Combo  - HIV Antigen Antibody Combo    Type 2 diabetes mellitus without complication, without long-term current use of insulin (H)  *  - HEMOGLOBIN A1C  - BASIC METABOLIC PANEL  - Lipid panel reflex to direct LDL Non-fasting  - Albumin Random Urine Quantitative with Creat Ratio  - HEMOGLOBIN A1C  - BASIC METABOLIC PANEL  - Lipid panel reflex to direct LDL Non-fasting  - Albumin Random Urine Quantitative with Creat Ratio    Screening for prostate cancer  *  - PSA, screen  - PSA, screen    RONEY (obstructive sleep apnea)  *    Routine general medical examination at a health care facility  **    - your A1c came back elevated and above goal range of 6.5 or less. Please continue Metformin but lets recheck in three months. Please try to focus on getting at least 30 gm of protein per meal and eat protein first before carbohydrates. Fiber is also essential. You could try intermittent fasting, such as stop eating at 7 pm and don't eat until 8 am or so. I know you are also working with the DM nurse, please continue.   - other labs look stable.   - sleep apnea discussed.   - according to guidelines, your should be a on statin medication. This med will aide in lowering CAD that can be associated with DM. If you agree, please let me know so I can place you on a low dose.     - The 10-year ASCVD risk score (Evette RENE, et al., 2019) is: 8.9%    Values used to calculate the score:      Age: 53 years      Sex: Male      Is Non- : No      Diabetic: Yes      Tobacco smoker: No      Systolic Blood Pressure: 134 mmHg      Is BP treated: No      HDL Cholesterol: 42 mg/dL      Total Cholesterol: 154 mg/dL                BMI  Estimated body mass index is 31.02 kg/m  as calculated from the following:     "Height as of this encounter: 1.727 m (5' 8\").    Weight as of this encounter: 92.5 kg (204 lb).   Weight management plan: Discussed healthy diet and exercise guidelines    Counseling  Appropriate preventive services were discussed with this patient, including applicable screening as appropriate for fall prevention, nutrition, physical activity, Tobacco-use cessation, weight loss and cognition.  Checklist reviewing preventive services available has been given to the patient.  Reviewed patient's diet, addressing concerns and/or questions.   He is at risk for lack of exercise and has been provided with information to increase physical activity for the benefit of his well-being.           Dilcia Scott is a 53 year old, presenting for the following:  Physical        4/8/2024     3:15 PM   Additional Questions   Roomed by Chavez        Via the [employer] Maintenance questionnaire, the patient has reported the following services have been completed -Eye Exam, this information has been sent to the abstraction team.  Health Care Directive  Patient does not have a Health Care Directive or Living Will: Discussed advance care planning with patient; information given to patient to review.    Healthy Habits:     Getting at least 3 servings of Calcium per day:  Yes    Bi-annual eye exam:  Yes    Dental care twice a year:  Yes    Sleep apnea or symptoms of sleep apnea:  Sleep apnea    Diet:  Carbohydrate counting and Diabetic    Frequency of exercise:  6-7 days/week    Duration of exercise:  Less than 15 minutes    Taking medications regularly:  Yes    Barriers to taking medications:  None    Medication side effects:  None    Additional concerns today:  No              4/7/2024   General Health   How would you rate your overall physical health? Excellent   Feel stress (tense, anxious, or unable to sleep) Not at all         4/7/2024   Nutrition   Three or more servings of calcium each day? (!) NO   Diet: Carbohydrate counting "   How many servings of fruit and vegetables per day? (!) 0-1   How many sweetened beverages each day? 0-1         4/7/2024   Exercise   Days per week of moderate/strenous exercise 3 days   Average minutes spent exercising at this level 10 min         4/7/2024   Social Factors   Frequency of gathering with friends or relatives Once a week   Worry food won't last until get money to buy more No   Food not last or not have enough money for food? No   Do you have housing?  Yes   Are you worried about losing your housing? No   Lack of transportation? No   Unable to get utilities (heat,electricity)? No         4/7/2024   Fall Risk   Fallen 2 or more times in the past year? No   Trouble with walking or balance? No          4/7/2024   Dental   Dentist two times every year? Yes         4/7/2024   TB Screening   Were you born outside of the [place]? No         Today's PHQ-2 Score:       4/7/2024     1:10 PM   PHQ-2 ( 1999 [employer])   Q1: Little interest or pleasure in doing things 0   Q2: Feeling down, depressed or hopeless 0   PHQ-2 Score 0   Q1: Little interest or pleasure in doing things Not at all   Q2: Feeling down, depressed or hopeless Not at all   PHQ-2 Score 0           4/7/2024   Substance Use   Alcohol more than 3/day or more than 7/wk No   Do you use any other substances recreationally? No     Social History     Tobacco Use    Smoking status: Never     Passive exposure: Never    Smokeless tobacco: Never   Vaping Use    Vaping status: Never Used   Substance Use Topics    Alcohol use: Yes    Drug use: Never             4/7/2024   One time HIV Screening   Previous HIV test? Yes         4/7/2024   STI Screening   New sexual partner(s) since last STI/HIV test? No   ASCVD Risk   The ASCVD Risk score (Evette RENE, et al., 2019) failed to calculate for the following reasons:    The systolic blood pressure is missing           Reviewed and updated as needed this visit by Provider                             Objective   "  Exam  There were no vitals taken for this visit.   Estimated body mass index is 31.47 kg/m  as calculated from the following:    Height as of 1/31/23: 1.727 m (5' 8\").    Weight as of 1/31/23: 93.9 kg (207 lb).    Physical Exam          Signed Electronically by: MODE Ruelas CNP    "

## 2024-04-09 LAB
ANION GAP SERPL CALCULATED.3IONS-SCNC: 13 MMOL/L (ref 7–15)
BUN SERPL-MCNC: 12.2 MG/DL (ref 6–20)
CALCIUM SERPL-MCNC: 9.7 MG/DL (ref 8.6–10)
CHLORIDE SERPL-SCNC: 101 MMOL/L (ref 98–107)
CHOLEST SERPL-MCNC: 154 MG/DL
CREAT SERPL-MCNC: 1.05 MG/DL (ref 0.67–1.17)
CREAT UR-MCNC: 137 MG/DL
DEPRECATED HCO3 PLAS-SCNC: 26 MMOL/L (ref 22–29)
EGFRCR SERPLBLD CKD-EPI 2021: 84 ML/MIN/1.73M2
FASTING STATUS PATIENT QL REPORTED: NORMAL
GLUCOSE SERPL-MCNC: 111 MG/DL (ref 70–99)
HDLC SERPL-MCNC: 42 MG/DL
HIV 1+2 AB+HIV1 P24 AG SERPL QL IA: NONREACTIVE
LDLC SERPL CALC-MCNC: 85 MG/DL
MICROALBUMIN UR-MCNC: <12 MG/L
MICROALBUMIN/CREAT UR: NORMAL MG/G{CREAT}
NONHDLC SERPL-MCNC: 112 MG/DL
POTASSIUM SERPL-SCNC: 4.1 MMOL/L (ref 3.4–5.3)
PSA SERPL DL<=0.01 NG/ML-MCNC: 0.7 NG/ML (ref 0–3.5)
SODIUM SERPL-SCNC: 140 MMOL/L (ref 135–145)
TRIGL SERPL-MCNC: 135 MG/DL

## 2024-04-11 RX ORDER — METFORMIN HCL 500 MG
TABLET, EXTENDED RELEASE 24 HR ORAL
Qty: 360 TABLET | Refills: 3 | Status: SHIPPED | OUTPATIENT
Start: 2024-04-11

## 2024-05-14 ENCOUNTER — VIRTUAL VISIT (OUTPATIENT)
Dept: EDUCATION SERVICES | Facility: CLINIC | Age: 54
End: 2024-05-14
Payer: COMMERCIAL

## 2024-05-14 DIAGNOSIS — E11.9 DIABETES MELLITUS (H): Primary | ICD-10-CM

## 2024-05-14 PROCEDURE — G0108 DIAB MANAGE TRN  PER INDIV: HCPCS | Performed by: DIETITIAN, REGISTERED

## 2024-05-14 NOTE — LETTER
"    5/14/2024         RE: Tyler Turk  [address] [address]        Dear Colleague,    Thank you for referring your patient, Tyler Turk, to the [facility]. Please see a copy of my visit note below.    Diabetes Self-Management Education & Support    Presents for:      Type of Service: Telephone Visit      ASSESSMENT:  Most recent A1C:7.9 (4/8/24). Tyler is trying to get back on track with diet and activity routine  Continues to take 2000 mg metformin daily. Has been testing BG 1-2x/d and reports BS readings ranging 130 - 200s. States the high readings are mostly diet related. Has been trying to eat more home cooked meals and less fast food.       Also trying to workout about 5x/wk.     No other concerns today.    Patient's most recent   Lab Results   Component Value Date    A1C 7.9 04/08/2024     is not meeting goal of <7.0    PLAN    Continue with the current therapy and DM management plan - regular monitoring of BG, daily physical activity as able/safe, eating healthy & watching portions of carbs   Follow-up: 3-4 months (or sooner if concerns)    See Care Plan for co-developed, patient-state behavior change goals.  AVS provided for patient today.    SUBJECTIVE/OBJECTIVE:  Diabetes education in the past 24mo: Yes  Diabetes type: Type 2  Disease course: Stable  How confident are you filling out medical forms by yourself:: Extremely  Cultural Influences/Ethnic Background:  Not  or     Diabetes Symptoms & Complications:  Diabetes Related Symptoms: Fatigue  Weight trend: Stable  Symptom course: Stable  Disease course: Stable       Patient Problem List and Family Medical History reviewed for relevant medical history, current medical status, and diabetes risk factors.    Vitals:  There were no vitals taken for this visit.  Estimated body mass index is 31.02 kg/m  as calculated from the following:    Height as of 4/8/24: 1.727 m (5' 8\").    Weight as " "of 4/8/24: 92.5 kg (204 lb).   Last 3 BP:   BP Readings from Last 3 Encounters:   04/08/24 134/88   01/31/23 126/82   01/26/22 110/80       History   Smoking Status     Never   Smokeless Tobacco     Never       Labs:  Lab Results   Component Value Date    A1C 7.9 04/08/2024     Lab Results   Component Value Date     04/08/2024     02/18/2021     Lab Results   Component Value Date    LDL 85 04/08/2024     Direct Measure HDL   Date Value Ref Range Status   04/08/2024 42 >=40 mg/dL Final   ]  GFR Estimate   Date Value Ref Range Status   04/08/2024 84 >60 mL/min/1.73m2 Final   02/18/2021 >60 >60 mL/min/1.73m2 Final     GFR Estimate If Black   Date Value Ref Range Status   02/18/2021 >60 >60 mL/min/1.73m2 Final     Lab Results   Component Value Date    CR 1.05 04/08/2024     No results found for: \"MICROALBUMIN\"    Healthy Eating:  Cultural/Pentecostal diet restrictions?: No  How many times a week on average do you eat food made away from home (restaurant/take-out)?: 3  Meals include: Breakfast, Lunch  Beverages: Water, Coffee, Milk, Diet soda, Soda, Sports drinks, Energy drinks, Coffee drinks    Being Active:  Barrier to exercise: Time    Monitoring:  Blood Glucose Meter: One Touch  Times checking blood sugar at home (number): 1  Times checking blood sugar at home (per): Day    Taking Medications:  Diabetes Medication(s)       Biguanides       metFORMIN (GLUCOPHAGE XR) 500 MG 24 hr tablet TAKE 4 TABLET BY MOUTH DAILY. Strength: 500 mg            Current Treatments: Oral Medication (taken by mouth)    Reducing Risks:  Has dilated eye exam at least once a year?: Yes  Sees dentist every 6 months?: Yes  Feet checked by healthcare provider in the last year?: Yes    Healthy Coping:  Informal Support system:: Family, Spouse  Patient Activation Measure Survey Score:       No data to display                Time Spent: 30 minutes  Encounter Type: Individual    Any diabetes medication dose changes were made via the CDE " Protocol per the patient's referring provider. A copy of this encounter was shared with the provider.

## 2024-05-14 NOTE — PROGRESS NOTES
"Diabetes Self-Management Education & Support    Presents for:      Type of Service: Telephone Visit      ASSESSMENT:  Most recent A1C:7.9 (4/8/24). Tyler is trying to get back on track with diet and activity routine  Continues to take 2000 mg metformin daily. Has been testing BG 1-2x/d and reports BS readings ranging 130 - 200s. States the high readings are mostly diet related. Has been trying to eat more home cooked meals and less fast food.       Also trying to workout about 5x/wk.     No other concerns today.    Patient's most recent   Lab Results   Component Value Date    A1C 7.9 04/08/2024     is not meeting goal of <7.0    PLAN    Continue with the current therapy and DM management plan - regular monitoring of BG, daily physical activity as able/safe, eating healthy & watching portions of carbs   Follow-up: 3-4 months (or sooner if concerns)    See Care Plan for co-developed, patient-state behavior change goals.  AVS provided for patient today.    SUBJECTIVE/OBJECTIVE:  Diabetes education in the past 24mo: Yes  Diabetes type: Type 2  Disease course: Stable  How confident are you filling out medical forms by yourself:: Extremely  Cultural Influences/Ethnic Background:  Not  or     Diabetes Symptoms & Complications:  Diabetes Related Symptoms: Fatigue  Weight trend: Stable  Symptom course: Stable  Disease course: Stable       Patient Problem List and Family Medical History reviewed for relevant medical history, current medical status, and diabetes risk factors.    Vitals:  There were no vitals taken for this visit.  Estimated body mass index is 31.02 kg/m  as calculated from the following:    Height as of 4/8/24: 1.727 m (5' 8\").    Weight as of 4/8/24: 92.5 kg (204 lb).   Last 3 BP:   BP Readings from Last 3 Encounters:   04/08/24 134/88   01/31/23 126/82   01/26/22 110/80       History   Smoking Status    Never   Smokeless Tobacco    Never       Labs:  Lab Results   Component Value Date    A1C " "7.9 04/08/2024     Lab Results   Component Value Date     04/08/2024     02/18/2021     Lab Results   Component Value Date    LDL 85 04/08/2024     Direct Measure HDL   Date Value Ref Range Status   04/08/2024 42 >=40 mg/dL Final   ]  GFR Estimate   Date Value Ref Range Status   04/08/2024 84 >60 mL/min/1.73m2 Final   02/18/2021 >60 >60 mL/min/1.73m2 Final     GFR Estimate If Black   Date Value Ref Range Status   02/18/2021 >60 >60 mL/min/1.73m2 Final     Lab Results   Component Value Date    CR 1.05 04/08/2024     No results found for: \"MICROALBUMIN\"    Healthy Eating:  Cultural/[employer] diet restrictions?: No  How many times a week on average do you eat food made away from home (restaurant/take-out)?: 3  Meals include: Breakfast, Lunch  Beverages: Water, Coffee, Milk, Diet soda, Soda, Sports drinks, Energy drinks, Coffee drinks    Being Active:  Barrier to exercise: Time    Monitoring:  Blood Glucose Meter: One Touch  Times checking blood sugar at home (number): 1  Times checking blood sugar at home (per): Day    Taking Medications:  Diabetes Medication(s)       Biguanides       metFORMIN (GLUCOPHAGE XR) 500 MG 24 hr tablet TAKE 4 TABLET BY MOUTH DAILY. Strength: 500 mg            Current Treatments: Oral Medication (taken by mouth)    Reducing Risks:  Has dilated eye exam at least once a year?: Yes  Sees dentist every 6 months?: Yes  Feet checked by healthcare provider in the last year?: Yes    Healthy Coping:  Informal Support system:: Family, Spouse  Patient Activation Measure Survey Score:       No data to display                Time Spent: 30 minutes  Encounter Type: Individual    Any diabetes medication dose changes were made via the CDE Protocol per the patient's referring provider. A copy of this encounter was shared with the provider.   "

## 2024-07-14 ENCOUNTER — HEALTH MAINTENANCE LETTER (OUTPATIENT)
Age: 54
End: 2024-07-14

## 2024-07-23 ENCOUNTER — VIRTUAL VISIT (OUTPATIENT)
Dept: FAMILY MEDICINE | Facility: CLINIC | Age: 54
End: 2024-07-23
Payer: COMMERCIAL

## 2024-07-23 DIAGNOSIS — E66.811 CLASS 1 OBESITY DUE TO EXCESS CALORIES WITH SERIOUS COMORBIDITY AND BODY MASS INDEX (BMI) OF 31.0 TO 31.9 IN ADULT: ICD-10-CM

## 2024-07-23 DIAGNOSIS — E11.65 TYPE 2 DIABETES MELLITUS WITH HYPERGLYCEMIA, WITHOUT LONG-TERM CURRENT USE OF INSULIN (H): ICD-10-CM

## 2024-07-23 DIAGNOSIS — E66.09 CLASS 1 OBESITY DUE TO EXCESS CALORIES WITH SERIOUS COMORBIDITY AND BODY MASS INDEX (BMI) OF 31.0 TO 31.9 IN ADULT: ICD-10-CM

## 2024-07-23 DIAGNOSIS — K76.0 FATTY LIVER: Primary | ICD-10-CM

## 2024-07-23 DIAGNOSIS — G47.33 OSA (OBSTRUCTIVE SLEEP APNEA): ICD-10-CM

## 2024-07-23 PROCEDURE — G2211 COMPLEX E/M VISIT ADD ON: HCPCS | Mod: 95 | Performed by: NURSE PRACTITIONER

## 2024-07-23 PROCEDURE — 99214 OFFICE O/P EST MOD 30 MIN: CPT | Mod: 95 | Performed by: NURSE PRACTITIONER

## 2024-07-23 RX ORDER — ATORVASTATIN CALCIUM 20 MG/1
20 TABLET, FILM COATED ORAL DAILY
Qty: 90 TABLET | Refills: 3 | Status: SHIPPED | OUTPATIENT
Start: 2024-07-23

## 2024-07-23 NOTE — PROGRESS NOTES
Tyler is a 54 year old who is being evaluated via a billable video visit.    How would you like to obtain your AVS? MyChart  If the video visit is dropped, the invitation should be resent by: Text to cell phone: [phone]  Will anyone else be joining your video visit? No      Assessment & Plan     Fatty liver  **  - semaglutide (OZEMPIC) 2 MG/3ML pen  Dispense: 3 mL; Refill: 0  - semaglutide (OZEMPIC) 2 MG/3ML pen  Dispense: 3 mL; Refill: 2    Type 2 diabetes mellitus with hyperglycemia, without long-term current use of insulin (H)  **  - **Hemoglobin A1c FUTURE 3mo  - Hemoglobin A1c  - semaglutide (OZEMPIC) 2 MG/3ML pen  Dispense: 3 mL; Refill: 0  - semaglutide (OZEMPIC) 2 MG/3ML pen  Dispense: 3 mL; Refill: 2  - atorvastatin (LIPITOR) 20 MG tablet  Dispense: 90 tablet; Refill: 3    RONEY (obstructive sleep apnea)  **  - semaglutide (OZEMPIC) 2 MG/3ML pen  Dispense: 3 mL; Refill: 0  - semaglutide (OZEMPIC) 2 MG/3ML pen  Dispense: 3 mL; Refill: 2    Class 1 obesity due to excess calories with serious comorbidity and body mass index (BMI) of 31.0 to 31.9 in adult  **  - semaglutide (OZEMPIC) 2 MG/3ML pen  Dispense: 3 mL; Refill: 0  - semaglutide (OZEMPIC) 2 MG/3ML pen  Dispense: 3 mL; Refill: 2    - will add on Ozempic since weight has been a struggle, has sleep apnea, and increased blood sugars. No contraindications noted. Side effects discussed. OV in three months.   - per guidelines, low dose statin ordered.  No family HO heart attacks or strokes.   - diet and lifestyle changes discussed. Please try to focus on getting at least 30 gm of protein per meal and eat protein first before carbohydrates. Fiber is also essential. Grape Nut cereal and green kiwis can help with constipation. Stay hydrated.     The longitudinal plan of care for the diagnosis(es)/condition(s) as documented were addressed during this visit. Due to the added complexity in care, I will continue to support Tyler in the subsequent management  and with ongoing continuity of care.        Dilcia Scott is a 54 year old, presenting for the following health issues:  Diabetes        4/8/2024     3:15 PM   Additional Questions   Roomed by Chavez     - no thyroid cancer or pancreatitis. Diet is still a struggle, but exercising regularly.     HPI     Diabetes Follow-up    How often are you checking your blood sugar? One time daily  What time of day are you checking your blood sugars (select all that apply)?  Before meals  Have you had any blood sugars above 200?  No  Have you had any blood sugars below 70?  No  What symptoms do you notice when your blood sugar is low?  Shaky  What concerns do you have today about your diabetes? Other: BGM is 160 in AM. Still trying to work on low carb diet. Taking Metformin. Can get constipation, but intermittent fasting has been helpful.    Do you have any of these symptoms? (Select all that apply)  No numbness or tingling in feet.  No redness, sores or blisters on feet.  No complaints of excessive thirst.  No reports of blurry vision.  No significant changes to weight.      BP Readings from Last 2 Encounters:   04/08/24 134/88   01/31/23 126/82     Hemoglobin A1C (%)   Date Value   04/08/2024 7.9 (H)   11/18/2022 6.8 (H)     LDL Cholesterol Calculated (mg/dL)   Date Value   04/08/2024 85   11/18/2022 85       Review of Systems  Constitutional, HEENT, cardiovascular, pulmonary, gi and gu systems are negative, except as otherwise noted.      Objective    Vitals - Patient Reported  Weight (Patient Reported): 93.9 kg (207 lb)  Pain Score: No Pain (0)        Physical Exam   GENERAL: alert and no distress  EYES: Eyes grossly normal to inspection.  No discharge or erythema, or obvious scleral/conjunctival abnormalities.  RESP: No audible wheeze, cough, or visible cyanosis.    SKIN: Visible skin clear. No significant rash, abnormal pigmentation or lesions.  NEURO: Cranial nerves grossly intact.  Mentation and speech appropriate  for age.  PSYCH: Appropriate affect, tone, and pace of words    Transferred Records on 04/08/2024   Component Date Value Ref Range Status    RETINOPATHY 04/08/2024 UNKNOWN   Final     Last Comprehensive Metabolic Panel:  Lab Results   Component Value Date     04/08/2024    POTASSIUM 4.1 04/08/2024    CHLORIDE 101 04/08/2024    CO2 26 04/08/2024    ANIONGAP 13 04/08/2024     (H) 04/08/2024    BUN 12.2 04/08/2024    CR 1.05 04/08/2024    GFRESTIMATED 84 04/08/2024    JASON 9.7 04/08/2024       Lab Results   Component Value Date    A1C 7.9 04/08/2024    A1C 6.8 11/18/2022    A1C 6.3 01/26/2022    A1C 5.9 07/28/2021    A1C 9.0 02/18/2021         Video-Visit Details    Type of service:  Video Visit   Originating Location (pt. Location): Home    Distant Location (provider location):  On-site  Platform used for Video Visit: Felipe  Signed Electronically by: MODE Ruelas CNP

## 2024-08-12 ENCOUNTER — MYC REFILL (OUTPATIENT)
Dept: FAMILY MEDICINE | Facility: CLINIC | Age: 54
End: 2024-08-12
Payer: COMMERCIAL

## 2024-08-12 DIAGNOSIS — E11.9 DIABETES MELLITUS WITHOUT COMPLICATION (H): ICD-10-CM

## 2024-08-13 ENCOUNTER — LAB (OUTPATIENT)
Dept: LAB | Facility: CLINIC | Age: 54
End: 2024-08-13
Payer: COMMERCIAL

## 2024-08-13 DIAGNOSIS — E11.65 TYPE 2 DIABETES MELLITUS WITH HYPERGLYCEMIA, WITHOUT LONG-TERM CURRENT USE OF INSULIN (H): ICD-10-CM

## 2024-08-13 LAB — HBA1C MFR BLD: 7.7 % (ref 0–5.6)

## 2024-08-13 PROCEDURE — 36415 COLL VENOUS BLD VENIPUNCTURE: CPT

## 2024-08-13 PROCEDURE — 83036 HEMOGLOBIN GLYCOSYLATED A1C: CPT

## 2024-08-13 RX ORDER — BLOOD SUGAR DIAGNOSTIC
STRIP MISCELLANEOUS
Qty: 200 STRIP | Refills: 1 | Status: SHIPPED | OUTPATIENT
Start: 2024-08-13 | End: 2024-09-16

## 2024-08-14 NOTE — RESULT ENCOUNTER NOTE
Hi    Your A1c is stable but still elevated for your age. How is it going on Ozempic? You should be on the 0.5 mg dose by now... How is your diet? More protein and fiber, less carbs?     Please let me know. For sure check this lab again in 3 months. Ideal range is 6.5 or lower.     Lanie

## 2024-08-22 DIAGNOSIS — E66.811 CLASS 1 OBESITY DUE TO EXCESS CALORIES WITH SERIOUS COMORBIDITY AND BODY MASS INDEX (BMI) OF 31.0 TO 31.9 IN ADULT: ICD-10-CM

## 2024-08-22 DIAGNOSIS — K76.0 FATTY LIVER: ICD-10-CM

## 2024-08-22 DIAGNOSIS — E11.65 TYPE 2 DIABETES MELLITUS WITH HYPERGLYCEMIA, WITHOUT LONG-TERM CURRENT USE OF INSULIN (H): ICD-10-CM

## 2024-08-22 DIAGNOSIS — G47.33 OSA (OBSTRUCTIVE SLEEP APNEA): ICD-10-CM

## 2024-08-22 DIAGNOSIS — E66.09 CLASS 1 OBESITY DUE TO EXCESS CALORIES WITH SERIOUS COMORBIDITY AND BODY MASS INDEX (BMI) OF 31.0 TO 31.9 IN ADULT: ICD-10-CM

## 2024-08-22 RX ORDER — SEMAGLUTIDE 0.68 MG/ML
INJECTION, SOLUTION SUBCUTANEOUS
OUTPATIENT
Start: 2024-08-22

## 2024-08-23 DIAGNOSIS — E11.9 DIABETES MELLITUS WITHOUT COMPLICATION (H): ICD-10-CM

## 2024-08-23 RX ORDER — BLOOD SUGAR DIAGNOSTIC
STRIP MISCELLANEOUS
Qty: 200 STRIP | Refills: 1 | OUTPATIENT
Start: 2024-08-23

## 2024-09-16 DIAGNOSIS — E11.9 DIABETES MELLITUS WITHOUT COMPLICATION (H): ICD-10-CM

## 2024-09-16 RX ORDER — BLOOD SUGAR DIAGNOSTIC
STRIP MISCELLANEOUS
Qty: 200 STRIP | Refills: 1 | Status: CANCELLED | OUTPATIENT
Start: 2024-09-16

## 2024-09-16 NOTE — TELEPHONE ENCOUNTER
Patient came to , is looking for refills on diabetic test strips and [facility] pharmacy told him request was denied. Available at [phone] if needed

## 2024-09-17 RX ORDER — BLOOD SUGAR DIAGNOSTIC
STRIP MISCELLANEOUS
Qty: 200 STRIP | Refills: 10 | Status: SHIPPED | OUTPATIENT
Start: 2024-09-17

## 2024-10-07 ENCOUNTER — VIRTUAL VISIT (OUTPATIENT)
Dept: EDUCATION SERVICES | Facility: CLINIC | Age: 54
End: 2024-10-07
Payer: COMMERCIAL

## 2024-10-07 DIAGNOSIS — E11.9 DIABETES MELLITUS (H): Primary | ICD-10-CM

## 2024-10-07 PROCEDURE — G0108 DIAB MANAGE TRN  PER INDIV: HCPCS | Mod: 93 | Performed by: DIETITIAN, REGISTERED

## 2024-10-07 NOTE — LETTER
"    10/7/2024         RE: Tyler Turk  [address] [address]        Dear Colleague,    Thank you for referring your patient, Tyler Turk, to the [facility]. Please see a copy of my visit note below.      Diabetes Self-Management Education & Support    Presents for:      Type of Service: Telephone Visit      ASSESSMENT:    Slight improvement in glycemic control but not yet meeting ADA goal of <7%.   Tyler has been trying to get back on track with diet and activity routine; reports ~4-5 lb wt loss and current wt of ~200 lb    Currently takin mg metformin daily.   0.5 mg ozempic weekly (). Reports decreased appetite  Denies any SE.     SMBG:  Tests BG 1-2x/d and per report BS readings range 120s-140s which he feels are much better than the 180s-200s he was getting before.    Food recall:  BF:heck, egg, sausage  L: didn't remember  D: steak salad  Drinking more water, feels could do better still       Exercises at least 4x/wk.     No other concerns today.    Patient's most recent   Lab Results   Component Value Date    A1C 7.7 2024     is not meeting goal of <7.0    PLAN    Continue with current therapy.  Topics to cover at upcoming visits: Problem Solving and Reducing Risks    Follow-up: 2-3 months  PCP:10/25    See Care Plan for co-developed, patient-state behavior change goals.  AVS provided for patient today.    Education Materials Provided:  No new materials provided today      SUBJECTIVE/OBJECTIVE:     Cultural Influences/Ethnic Background:  Not  or     Diabetes Symptoms & Complications:     Patient Problem List and Family Medical History reviewed for relevant medical history, current medical status, and diabetes risk factors.    Vitals:  There were no vitals taken for this visit.  Estimated body mass index is 31.02 kg/m  as calculated from the following:    Height as of 24: 1.727 m (5' 8\").    Weight as of 24: " "92.5 kg (204 lb).   Last 3 BP:   BP Readings from Last 3 Encounters:   04/08/24 134/88   01/31/23 126/82   01/26/22 110/80       History   Smoking Status     Never   Smokeless Tobacco     Never       Labs:  Lab Results   Component Value Date    A1C 7.7 08/13/2024     Lab Results   Component Value Date     04/08/2024     02/18/2021     Lab Results   Component Value Date    LDL 85 04/08/2024     Direct Measure HDL   Date Value Ref Range Status   04/08/2024 42 >=40 mg/dL Final   ]  GFR Estimate   Date Value Ref Range Status   04/08/2024 84 >60 mL/min/1.73m2 Final   02/18/2021 >60 >60 mL/min/1.73m2 Final     GFR Estimate If Black   Date Value Ref Range Status   02/18/2021 >60 >60 mL/min/1.73m2 Final     Lab Results   Component Value Date    CR 1.05 04/08/2024     No results found for: \"MICROALBUMIN\"    Taking Medications:  Diabetes Medication(s)       Biguanides       metFORMIN (GLUCOPHAGE XR) 500 MG 24 hr tablet TAKE 4 TABLET BY MOUTH DAILY. Strength: 500 mg       Incretin Mimetic Agents       semaglutide (OZEMPIC) 2 MG/3ML pen Inject 0.5 mg subcutaneously every 7 days             Patient Activation Measure Survey Score:       No data to display                Time Spent: 45 minutes  Encounter Type: Individual    Any diabetes medication dose changes were made via the CDE Protocol per the patient's referring provider. A copy of this encounter was shared with the provider.       "

## 2024-10-07 NOTE — PROGRESS NOTES
"  Diabetes Self-Management Education & Support    Presents for:      Type of Service: Telephone Visit      ASSESSMENT:    Slight improvement in glycemic control but not yet meeting ADA goal of <7%.   Tyler has been trying to get back on track with diet and activity routine; reports ~4-5 lb wt loss and current wt of ~200 lb    Currently takin mg metformin daily.   0.5 mg ozempic weekly (). Reports decreased appetite  Denies any SE.     SMBG:  Tests BG 1-2x/d and per report BS readings range 120s-140s which he feels are much better than the 180s-200s he was getting before.    Food recall:  BF:heck, egg, sausage  L: didn't remember  D: steak salad  Drinking more water, feels could do better still       Exercises at least 4x/wk.     No other concerns today.    Patient's most recent   Lab Results   Component Value Date    A1C 7.7 2024     is not meeting goal of <7.0    PLAN    Continue with current therapy.  Topics to cover at upcoming visits: Problem Solving and Reducing Risks    Follow-up: 2-3 months  PCP:10/25    See Care Plan for co-developed, patient-state behavior change goals.  AVS provided for patient today.    Education Materials Provided:  No new materials provided today      SUBJECTIVE/OBJECTIVE:     Cultural Influences/Ethnic Background:  Not  or     Diabetes Symptoms & Complications:     Patient Problem List and Family Medical History reviewed for relevant medical history, current medical status, and diabetes risk factors.    Vitals:  There were no vitals taken for this visit.  Estimated body mass index is 31.02 kg/m  as calculated from the following:    Height as of 24: 1.727 m (5' 8\").    Weight as of 24: 92.5 kg (204 lb).   Last 3 BP:   BP Readings from Last 3 Encounters:   24 134/88   23 126/82   22 110/80       History   Smoking Status    Never   Smokeless Tobacco    Never       Labs:  Lab Results   Component Value Date    A1C 7.7 " "08/13/2024     Lab Results   Component Value Date     04/08/2024     02/18/2021     Lab Results   Component Value Date    LDL 85 04/08/2024     Direct Measure HDL   Date Value Ref Range Status   04/08/2024 42 >=40 mg/dL Final   ]  GFR Estimate   Date Value Ref Range Status   04/08/2024 84 >60 mL/min/1.73m2 Final   02/18/2021 >60 >60 mL/min/1.73m2 Final     GFR Estimate If Black   Date Value Ref Range Status   02/18/2021 >60 >60 mL/min/1.73m2 Final     Lab Results   Component Value Date    CR 1.05 04/08/2024     No results found for: \"MICROALBUMIN\"    Taking Medications:  Diabetes Medication(s)       Biguanides       metFORMIN (GLUCOPHAGE XR) 500 MG 24 hr tablet TAKE 4 TABLET BY MOUTH DAILY. Strength: 500 mg       Incretin Mimetic Agents       semaglutide (OZEMPIC) 2 MG/3ML pen Inject 0.5 mg subcutaneously every 7 days             Patient Activation Measure Survey Score:       No data to display                Time Spent: 45 minutes  Encounter Type: Individual    Any diabetes medication dose changes were made via the CDE Protocol per the patient's referring provider. A copy of this encounter was shared with the provider.   "

## 2024-10-25 ENCOUNTER — OFFICE VISIT (OUTPATIENT)
Dept: FAMILY MEDICINE | Facility: CLINIC | Age: 54
End: 2024-10-25
Attending: NURSE PRACTITIONER
Payer: COMMERCIAL

## 2024-10-25 VITALS
WEIGHT: 206 LBS | BODY MASS INDEX: 31.22 KG/M2 | TEMPERATURE: 98.7 F | RESPIRATION RATE: 14 BRPM | HEART RATE: 77 BPM | SYSTOLIC BLOOD PRESSURE: 119 MMHG | HEIGHT: 68 IN | OXYGEN SATURATION: 96 % | DIASTOLIC BLOOD PRESSURE: 83 MMHG

## 2024-10-25 DIAGNOSIS — E66.811 CLASS 1 OBESITY DUE TO EXCESS CALORIES WITH SERIOUS COMORBIDITY AND BODY MASS INDEX (BMI) OF 31.0 TO 31.9 IN ADULT: ICD-10-CM

## 2024-10-25 DIAGNOSIS — K76.0 FATTY LIVER: ICD-10-CM

## 2024-10-25 DIAGNOSIS — E11.65 TYPE 2 DIABETES MELLITUS WITH HYPERGLYCEMIA, WITHOUT LONG-TERM CURRENT USE OF INSULIN (H): Primary | ICD-10-CM

## 2024-10-25 DIAGNOSIS — G47.33 OSA (OBSTRUCTIVE SLEEP APNEA): ICD-10-CM

## 2024-10-25 DIAGNOSIS — E66.09 CLASS 1 OBESITY DUE TO EXCESS CALORIES WITH SERIOUS COMORBIDITY AND BODY MASS INDEX (BMI) OF 31.0 TO 31.9 IN ADULT: ICD-10-CM

## 2024-10-25 LAB
EST. AVERAGE GLUCOSE BLD GHB EST-MCNC: 174 MG/DL
HBA1C MFR BLD: 7.7 % (ref 0–5.6)

## 2024-10-25 PROCEDURE — 36415 COLL VENOUS BLD VENIPUNCTURE: CPT | Performed by: NURSE PRACTITIONER

## 2024-10-25 PROCEDURE — G2211 COMPLEX E/M VISIT ADD ON: HCPCS | Performed by: NURSE PRACTITIONER

## 2024-10-25 PROCEDURE — 83036 HEMOGLOBIN GLYCOSYLATED A1C: CPT | Performed by: NURSE PRACTITIONER

## 2024-10-25 PROCEDURE — 99213 OFFICE O/P EST LOW 20 MIN: CPT | Performed by: NURSE PRACTITIONER

## 2024-10-25 NOTE — PROGRESS NOTES
"  Assessment & Plan     Type 2 diabetes mellitus with hyperglycemia, without long-term current use of insulin (H)  **  - **Hemoglobin A1c FUTURE 3mo  - semaglutide (OZEMPIC) 2 MG/3ML pen  Dispense: 3 mL; Refill: 3  - **Hemoglobin A1c FUTURE 3mo    Fatty liver  **  - semaglutide (OZEMPIC) 2 MG/3ML pen  Dispense: 3 mL; Refill: 3    RONEY (obstructive sleep apnea)  **  - semaglutide (OZEMPIC) 2 MG/3ML pen  Dispense: 3 mL; Refill: 3    Class 1 obesity due to excess calories with serious comorbidity and body mass index (BMI) of 31.0 to 31.9 in adult  **  - semaglutide (OZEMPIC) 2 MG/3ML pen  Dispense: 3 mL; Refill: 3    - will restart Ozempic and recheck A1c with lab only visit in three months (weight has been a struggle, has sleep apnea, and increased blood sugars).  No contraindications noted. Side effects discussed. OV in six months. He feels 0.5 will be tolerated. Side effect discussed.   - per guidelines, low dose statin ordered.  No family HO heart attacks or strokes.   - diet and lifestyle changes discussed. Please try to focus on getting at least 30 gm of protein per meal and eat protein first before carbohydrates. Fiber is also essential. Grape Nut cereal and green kiwis can help with constipation. Stay hydrated.      The longitudinal plan of care for the diagnosis(es)/condition(s) as documented were addressed during this visit. Due to the added complexity in care, I will continue to support Tyler in the subsequent management and with ongoing continuity of care.           BMI  Estimated body mass index is 31.32 kg/m  as calculated from the following:    Height as of this encounter: 1.727 m (5' 8\").    Weight as of this encounter: 93.4 kg (206 lb).   Weight management plan: Discussed healthy diet and exercise guidelines      Dilcia Scott is a 54 year old, presenting for the following health issues:  Recheck Medication (lab)      10/25/2024     2:39 PM   Additional Questions   Roomed by Chavez Ward" "stopped Ozempic. Not related to side effects. He tolerated med okay. He thinks his A1c is going to be high again. Trying to work on diet changes, and he does regular exercise.     History of Present Illness       Diabetes:   He presents for follow up of diabetes.  He is checking home blood glucose one time daily.   He checks blood glucose before meals.  Blood glucose is sometimes over 200 and sometimes under 70. He is aware of hypoglycemia symptoms including dizziness and weakness.    He has no concerns regarding his diabetes at this time.   He is not experiencing numbness or burning in feet, excessive thirst, blurry vision, weight changes or redness, sores or blisters on feet.                         Review of Systems  Constitutional, HEENT, cardiovascular, pulmonary, gi and gu systems are negative, except as otherwise noted.      Objective    /83   Pulse 77   Temp 98.7  F (37.1  C) (Oral)   Resp 14   Ht 1.727 m (5' 8\")   Wt 93.4 kg (206 lb)   SpO2 96%   BMI 31.32 kg/m    Body mass index is 31.32 kg/m .  Physical Exam   GENERAL: alert and no distress    Results for orders placed or performed in visit on 10/25/24   **Hemoglobin A1c FUTURE 3mo     Status: Abnormal   Result Value Ref Range    Estimated Average Glucose 174 (H) <117 mg/dL    Hemoglobin A1C 7.7 (H) 0.0 - 5.6 %     No results found for this or any previous visit (from the past 24 hours).        Signed Electronically by: MODE Ruelas CNP    "

## 2024-12-09 ENCOUNTER — VIRTUAL VISIT (OUTPATIENT)
Dept: EDUCATION SERVICES | Facility: CLINIC | Age: 54
End: 2024-12-09
Payer: COMMERCIAL

## 2024-12-09 DIAGNOSIS — E11.9 DIABETES MELLITUS (H): Primary | ICD-10-CM

## 2024-12-09 PROCEDURE — G0108 DIAB MANAGE TRN  PER INDIV: HCPCS | Mod: 93 | Performed by: DIETITIAN, REGISTERED

## 2024-12-09 NOTE — LETTER
12/9/2024         RE: Tyler Turk  [address] [address]        Dear Colleague,    Thank you for referring your patient, Tyler Turk, to the [facility]. Please see a copy of my visit note below.    Diabetes Self-Management Education & Support    Presents for:      Type of Service: Telephone Visit    Originating Location (Patient Location): Home  Distant Location (Provider Location): Offsite      ASSESSMENT:  Follow up DSME visit   A1c has been >7% for almost 8-9 months now. Pt attributes it to not being able to eat the most healthfully. He has been regular with the exercise though.    Currently on:2000 mg metformin and 0.5 mg weekly ozempic - tolerating well.  Recommend titrating ozempic upwards if next A1c not at goal.     SMBG:  Tests BG 1-2x/d. Per report BS readings range 100s-200s     3 meals daily  Food recall:  BF:sausage egg biscuit  L: did not remember  D: chicken salad  Denies any sweets/sugary drinks  Doing more water     Regular exercise: at least 4 - 5x/wk.     No other concerns today.     Patient's most recent   Lab Results   Component Value Date    A1C 7.7 10/25/2024     is not meeting goal of <7.0    PLAN    Recommend titrating ozempic upwards if next A1c not at goal - routing chart to PCP  Pt will be calling to schedule his PCP follow up for sometime in December     Topics to cover at upcoming visits: {AADE [id number]}    Follow-up: 3-4 months (or sooner if concerns)    See Care Plan for co-developed, patient-state behavior change goals.  AVS provided for patient today.    Education Materials Provided:  No new materials provided today      SUBJECTIVE/OBJECTIVE:     Cultural Influences/Ethnic Background:  Not  or     Diabetes Symptoms & Complications:    Patient Problem List and Family Medical History reviewed for relevant medical history, current medical status, and diabetes risk factors.    Vitals:  There were no vitals taken for  "this visit.  Estimated body mass index is 31.32 kg/m  as calculated from the following:    Height as of 10/25/24: 1.727 m (5' 8\").    Weight as of 10/25/24: 93.4 kg (206 lb).   Last 3 BP:   BP Readings from Last 3 Encounters:   10/25/24 119/83   04/08/24 134/88   01/31/23 126/82       History   Smoking Status     Never   Smokeless Tobacco     Never       Labs:  Lab Results   Component Value Date    A1C 7.7 10/25/2024     Lab Results   Component Value Date     04/08/2024     02/18/2021     Lab Results   Component Value Date    LDL 85 04/08/2024     Direct Measure HDL   Date Value Ref Range Status   04/08/2024 42 >=40 mg/dL Final   ]  GFR Estimate   Date Value Ref Range Status   04/08/2024 84 >60 mL/min/1.73m2 Final   02/18/2021 >60 >60 mL/min/1.73m2 Final     GFR Estimate If Black   Date Value Ref Range Status   02/18/2021 >60 >60 mL/min/1.73m2 Final     Lab Results   Component Value Date    CR 1.05 04/08/2024     Taking Medications:  Diabetes Medication(s)       Biguanides       metFORMIN (GLUCOPHAGE XR) 500 MG 24 hr tablet TAKE 4 TABLET BY MOUTH DAILY. Strength: 500 mg       Incretin Mimetic Agents       semaglutide (OZEMPIC) 2 MG/3ML pen Inject 0.5 mg subcutaneously every 7 days.            Patient Activation Measure Survey Score:       No data to display                Time Spent: 50 minutes  Encounter Type: Individual    Any diabetes medication dose changes were made via the CDE Protocol per the patient's referring provider. A copy of this encounter was shared with the provider.       "

## 2024-12-09 NOTE — PROGRESS NOTES
"Diabetes Self-Management Education & Support    Presents for:      Type of Service: Telephone Visit    Originating Location (Patient Location): Home  Distant Location (Provider Location): Offsite      ASSESSMENT:  Follow up DSME visit   A1c has been >7% for almost 8-9 months now. Pt attributes it to not being able to eat the most healthfully. He has been regular with the exercise though.    Currently on:2000 mg metformin and 0.5 mg weekly ozempic - tolerating well.  Recommend titrating ozempic upwards if next A1c not at goal.     SMBG:  Tests BG 1-2x/d. Per report BS readings range 100s-200s     3 meals daily  Food recall:  BF:sausage egg biscuit  L: did not remember  D: chicken salad  Denies any sweets/sugary drinks  Doing more water     Regular exercise: at least 4 - 5x/wk.     No other concerns today.     Patient's most recent   Lab Results   Component Value Date    A1C 7.7 10/25/2024     is not meeting goal of <7.0    PLAN    Recommend titrating ozempic upwards if next A1c not at goal - routing chart to PCP  Pt will be calling to schedule his PCP follow up for sometime in December     Topics to cover at upcoming visits: Healthy Eating, Monitoring, Problem Solving, and Reducing Risks    Follow-up: 3-4 months (or sooner if concerns)    See Care Plan for co-developed, patient-state behavior change goals.  AVS provided for patient today.    Education Materials Provided:  No new materials provided today      SUBJECTIVE/OBJECTIVE:     Cultural Influences/Ethnic Background:  Not  or     Diabetes Symptoms & Complications:    Patient Problem List and Family Medical History reviewed for relevant medical history, current medical status, and diabetes risk factors.    Vitals:  There were no vitals taken for this visit.  Estimated body mass index is 31.32 kg/m  as calculated from the following:    Height as of 10/25/24: 1.727 m (5' 8\").    Weight as of 10/25/24: 93.4 kg (206 lb).   Last 3 BP:   BP Readings from " Last 3 Encounters:   10/25/24 119/83   04/08/24 134/88   01/31/23 126/82       History   Smoking Status    Never   Smokeless Tobacco    Never       Labs:  Lab Results   Component Value Date    A1C 7.7 10/25/2024     Lab Results   Component Value Date     04/08/2024     02/18/2021     Lab Results   Component Value Date    LDL 85 04/08/2024     Direct Measure HDL   Date Value Ref Range Status   04/08/2024 42 >=40 mg/dL Final   ]  GFR Estimate   Date Value Ref Range Status   04/08/2024 84 >60 mL/min/1.73m2 Final   02/18/2021 >60 >60 mL/min/1.73m2 Final     GFR Estimate If Black   Date Value Ref Range Status   02/18/2021 >60 >60 mL/min/1.73m2 Final     Lab Results   Component Value Date    CR 1.05 04/08/2024     Taking Medications:  Diabetes Medication(s)       Biguanides       metFORMIN (GLUCOPHAGE XR) 500 MG 24 hr tablet TAKE 4 TABLET BY MOUTH DAILY. Strength: 500 mg       Incretin Mimetic Agents       semaglutide (OZEMPIC) 2 MG/3ML pen Inject 0.5 mg subcutaneously every 7 days.            Patient Activation Measure Survey Score:       No data to display                Time Spent: 50 minutes  Encounter Type: Individual    Any diabetes medication dose changes were made via the CDE Protocol per the patient's referring provider. A copy of this encounter was shared with the provider.

## 2024-12-09 NOTE — LETTER
12/9/2024         RE: Tyler Turk  [address] [facility] [address]        Dear Colleague,    Thank you for referring your patient, Tyler Turk, to the [facility]. Please see a copy of my visit note below.    Diabetes Self-Management Education & Support    Presents for:      Type of Service: Telephone Visit    Originating Location (Patient Location): Home  Distant Location (Provider Location): Offsite      ASSESSMENT:  Follow up DSME visit   A1c has been >7% for almost 8-9 months now. Pt attributes it to not being able to eat the most healthfully. He has been regular with the exercise though.    Currently on:2000 mg metformin and 0.5 mg weekly ozempic - tolerating well.  Recommend titrating ozempic upwards if next A1c not at goal.     SMBG:  Tests BG 1-2x/d. Per report BS readings range 100s-200s     3 meals daily  Food recall:  BF:sausage egg biscuit  L: did not remember  D: chicken salad  Denies any sweets/sugary drinks  Doing more water     Regular exercise: at least 4 - 5x/wk.     No other concerns today.     Patient's most recent   Lab Results   Component Value Date    A1C 7.7 10/25/2024     is not meeting goal of <7.0    PLAN    Recommend titrating ozempic upwards if next A1c not at goal - routing chart to PCP  Pt will be calling to schedule his PCP follow up for sometime in December     Topics to cover at upcoming visits: Healthy Eating, Monitoring, Problem Solving, and Reducing Risks    Follow-up: 3-4 months (or sooner if concerns)    See Care Plan for co-developed, patient-state behavior change goals.  AVS provided for patient today.    Education Materials Provided:  No new materials provided today      SUBJECTIVE/OBJECTIVE:     Cultural Influences/Ethnic Background:  Not  or     Diabetes Symptoms & Complications:    Patient Problem List and Family Medical History reviewed for relevant medical history, current medical status, and diabetes risk  "factors.    Vitals:  There were no vitals taken for this visit.  Estimated body mass index is 31.32 kg/m  as calculated from the following:    Height as of 10/25/24: 1.727 m (5' 8\").    Weight as of 10/25/24: 93.4 kg (206 lb).   Last 3 BP:   BP Readings from Last 3 Encounters:   10/25/24 119/83   04/08/24 134/88   01/31/23 126/82       History   Smoking Status    Never   Smokeless Tobacco    Never       Labs:  Lab Results   Component Value Date    A1C 7.7 10/25/2024     Lab Results   Component Value Date     04/08/2024     02/18/2021     Lab Results   Component Value Date    LDL 85 04/08/2024     Direct Measure HDL   Date Value Ref Range Status   04/08/2024 42 >=40 mg/dL Final   ]  GFR Estimate   Date Value Ref Range Status   04/08/2024 84 >60 mL/min/1.73m2 Final   02/18/2021 >60 >60 mL/min/1.73m2 Final     GFR Estimate If Black   Date Value Ref Range Status   02/18/2021 >60 >60 mL/min/1.73m2 Final     Lab Results   Component Value Date    CR 1.05 04/08/2024     Taking Medications:  Diabetes Medication(s)       Biguanides       metFORMIN (GLUCOPHAGE XR) 500 MG 24 hr tablet TAKE 4 TABLET BY MOUTH DAILY. Strength: 500 mg       Incretin Mimetic Agents       semaglutide (OZEMPIC) 2 MG/3ML pen Inject 0.5 mg subcutaneously every 7 days.            Patient Activation Measure Survey Score:       No data to display                Time Spent: 50 minutes  Encounter Type: Individual    Any diabetes medication dose changes were made via the CDE Protocol per the patient's referring provider. A copy of this encounter was shared with the provider.       "

## 2025-01-10 ENCOUNTER — TRANSFERRED RECORDS (OUTPATIENT)
Dept: MULTI SPECIALTY CLINIC | Facility: CLINIC | Age: 55
End: 2025-01-10

## 2025-01-10 LAB — RETINOPATHY: NORMAL

## 2025-02-15 ENCOUNTER — HEALTH MAINTENANCE LETTER (OUTPATIENT)
Age: 55
End: 2025-02-15

## 2025-02-18 ENCOUNTER — TELEPHONE (OUTPATIENT)
Dept: FAMILY MEDICINE | Facility: CLINIC | Age: 55
End: 2025-02-18
Payer: COMMERCIAL

## 2025-02-18 NOTE — TELEPHONE ENCOUNTER
LVM for patient to advise scheduled only for labs 2/21- cannot do preventative visit during lab appt, and not due until April. Can still do A1C labs on 2/21 if pt would like.

## 2025-03-08 DIAGNOSIS — E66.09 CLASS 1 OBESITY DUE TO EXCESS CALORIES WITH SERIOUS COMORBIDITY AND BODY MASS INDEX (BMI) OF 31.0 TO 31.9 IN ADULT: ICD-10-CM

## 2025-03-08 DIAGNOSIS — E66.811 CLASS 1 OBESITY DUE TO EXCESS CALORIES WITH SERIOUS COMORBIDITY AND BODY MASS INDEX (BMI) OF 31.0 TO 31.9 IN ADULT: ICD-10-CM

## 2025-03-08 DIAGNOSIS — E11.65 TYPE 2 DIABETES MELLITUS WITH HYPERGLYCEMIA, WITHOUT LONG-TERM CURRENT USE OF INSULIN (H): ICD-10-CM

## 2025-03-08 DIAGNOSIS — K76.0 FATTY LIVER: ICD-10-CM

## 2025-03-08 DIAGNOSIS — G47.33 OSA (OBSTRUCTIVE SLEEP APNEA): ICD-10-CM

## 2025-03-10 ENCOUNTER — PATIENT OUTREACH (OUTPATIENT)
Dept: CARE COORDINATION | Facility: CLINIC | Age: 55
End: 2025-03-10
Payer: COMMERCIAL

## 2025-03-10 RX ORDER — SEMAGLUTIDE 0.68 MG/ML
INJECTION, SOLUTION SUBCUTANEOUS
Qty: 2 ML | Refills: 0 | Status: SHIPPED | OUTPATIENT
Start: 2025-03-10

## 2025-03-24 ENCOUNTER — PATIENT OUTREACH (OUTPATIENT)
Dept: CARE COORDINATION | Facility: CLINIC | Age: 55
End: 2025-03-24
Payer: COMMERCIAL

## 2025-03-31 ENCOUNTER — VIRTUAL VISIT (OUTPATIENT)
Dept: EDUCATION SERVICES | Facility: CLINIC | Age: 55
End: 2025-03-31
Payer: COMMERCIAL

## 2025-03-31 DIAGNOSIS — E11.9 DIABETES MELLITUS (H): Primary | ICD-10-CM

## 2025-03-31 PROCEDURE — G0108 DIAB MANAGE TRN  PER INDIV: HCPCS | Mod: 95 | Performed by: DIETITIAN, REGISTERED

## 2025-03-31 NOTE — LETTER
3/31/2025         RE: Tyler Turk  [address] [facility] [address]        Dear Colleague,    Thank you for referring your patient, Tyler Turk, to the [facility]. Please see a copy of my visit note below.    Diabetes Self-Management Education & Support    Presents for:      ** called pt - he had forgotten about the video visit and wanted to continue on with the phone visit.    Type of Service: Telephone Visit    Originating Location (Patient Location): Home  Distant Location (Provider Location): Offsite      Assessment  Follow up diabetes ed visit   Unable to fully assess - no detailed SMBG data available. Pt denies any s/s of hyperglycemia.  A1c: 7.9 () > 7.7 (2024) . A1c has been >7% for almost a year now.  Pt shares that he has not been able to eat the most healthfully. Endorses consuming chips, fries, beer etc on a regular basis.  Has been regular with the exercise though.     Currently takin mg metformin  0.5 mg weekly ozempic () - no SE  Upcoming PCP appt on : recommend titrating ozempic upwards for further control.   *also routing chart to PCP     SMBG:  Tests BG 1-2x/d.   Per report BS readings range 140s - 200s      Consumes 3 meals daily. Eats out at least 4-5x/wk.    Likes to have Sobe water (unsweetened)  Food recall:  BF:Leftover pizza  L: Tamales (corn and pork)  D: burrito  Snack: cupcake  Denies any sweets/sugary drinks  Beverages:Trying to do more water, coffee, occasional diet Dr. Adams      Regular exercise, at least 10-15 mins, 5x/wk (weights/treadmill).  Will also start going for walks since it's starting to warm up now.      No other concerns today.     Patient's most recent   Lab Results   Component Value Date    A1C 7.9 2025     is not meeting goal of <7.0    Care Plan and Education Provided:  Healthy Eating: Balanced meals, Consistency in amount and timing of carbohydrate intake, Plate planning method,  Portion control, and Weight Management, Being Active: Finding a physical activity routine that works for you, Monitoring: Individual glucose targets and Purpose, Reducing Risks: Goal for A1c, how it relates to glucose and how often to check and Prevention, early diagnostic measures and treatment of complications, and Healthy Coping: Benefits of making appropriate lifestyle changes and Identifying helpful resources    Patient verbalized understanding of diabetes self-management education concepts discussed, opportunities for ongoing education and support, and recommendations provided today.    Plan    Recommend titrating ozempic upwards for further control - routed chart to PCP    Continue to test BG regularly and keep a BG log for review.  Pt to continue focusing on eating healthfully - watching CHO foods and portions, follow the plate method.  Plan ahead for healthy meals and snacks. Incorporate more non starchy veg and lean protein to your meals and snacks.  Regular activity as able/safe    Topics to cover at upcoming visits: Healthy Eating, Problem Solving, and Reducing Risks    Follow-up:  Upcoming Diabetes Ed Appointments     Visit Type Date Time Department    DIABETES ED 3/31/2025 12:00 PM [facility] DIABETES EDUCATION      Follow up:7/7 (pt prefers/requests for a phone visit)    See Care Plan for co-developed, patient-state behavior change goals.    Education Materials Provided:  No new materials provided today      Subjective/Objective  Tyler is an 54 year old year old, presenting for the following diabetes education related to: Diabetes education in the past 24mo: (Patient-Rptd) Yes  Diabetes type: (Patient-Rptd) Type 2  Disease course: (Patient-Rptd) Stable  How confident are you filling out medical forms by yourself:: (Patient-Rptd) Extremely  Cultural Influences/Ethnic Background:  Not  or     Diabetes Symptoms & Complications:  Diabetes Related Symptoms: (Patient-Rptd) Fatigue  Weight trend:  "(Patient-Rptd) Stable  Symptom course: (Patient-Rptd) Stable  Disease course: (Patient-Rptd) Stable     Patient Problem List and Family Medical History reviewed for relevant medical history, current medical status, and diabetes risk factors.    Vitals:  There were no vitals taken for this visit.  Estimated body mass index is 31.32 kg/m  as calculated from the following:    Height as of 10/25/24: 1.727 m (5' 8\").    Weight as of 10/25/24: 93.4 kg (206 lb).   Last 3 BP:   BP Readings from Last 3 Encounters:   10/25/24 119/83   04/08/24 134/88   01/31/23 126/82       History   Smoking Status    Never   Smokeless Tobacco    Never       Labs:  Lab Results   Component Value Date    A1C 7.9 02/21/2025     Lab Results   Component Value Date     04/08/2024     02/18/2021     Lab Results   Component Value Date    LDL 85 04/08/2024     Direct Measure HDL   Date Value Ref Range Status   04/08/2024 42 >=40 mg/dL Final   ]  GFR Estimate   Date Value Ref Range Status   04/08/2024 84 >60 mL/min/1.73m2 Final   02/18/2021 >60 >60 mL/min/1.73m2 Final     GFR Estimate If Black   Date Value Ref Range Status   02/18/2021 >60 >60 mL/min/1.73m2 Final     Lab Results   Component Value Date    CR 1.05 04/08/2024     No results found for: \"MICROALBUMIN\"    Healthy Eating:  Cultural/Tenriism diet restrictions?: (Patient-Rptd) No  How many times a week on average do you eat food made away from home (restaurant/take-out)?: (Patient-Rptd) 3  Meals include: (Patient-Rptd) Breakfast, Lunch, Dinner  Beverages: (Patient-Rptd) Water, Coffee, Juice, Diet soda, Soda, Alcohol    Being Active:  Barrier to exercise: (Patient-Rptd) None  Reducing Risks:  Has dilated eye exam at least once a year?: (Patient-Rptd) Yes  Sees dentist every 6 months?: (Patient-Rptd) Yes  Feet checked by healthcare provider in the last year?: (Patient-Rptd) Yes    Autumn Nickerson RD  Time Spent: 35 minutes  Encounter Type: Individual    Any diabetes medication dose " changes were made via the [facility] Standing Orders under the patient's referring provider.   Answers submitted by the patient for this visit:  Questionnaire about: Diabetes problem (Submitted on 3/31/2025)  Chief Complaint: Diabetes problem

## 2025-03-31 NOTE — LETTER
3/31/2025         RE: Tyler Turk  [address] [facility] [address]        Dear Colleague,    Thank you for referring your patient, Tyler Turk, to the [facility]. Please see a copy of my visit note below.    Diabetes Self-Management Education & Support    Presents for:      ** called pt - he had forgotten about the video visit and wanted wanted to continue on with     Type of Service: Telephone Visit    Originating Location (Patient Location): Home  Distant Location (Provider Location): Offsite      Assessment  Follow up diabetes ed visit   Unable to fully assess - no detailed SMBG data available.   A1c: 7.9 () > 7.7 (2024) . A1c has been >7% for almost a year now.  Pt attributes it to not being able to eat the most healthfully.   Has been regular with the exercise though     Currently takin mg metformin  0.5 mg weekly ozempic () - no SE  Recommend titrating ozempic upwards if next A1c not at goal.      SMBG:  Tests BG 1-2x/d.   Per report BS readings range 140s - 200s      Consumes 3 meals daily. Eats out at least 4-5x/wk. Endorses chips, fries, beer,   Likes to have Sobe water  Food recall:  BF:Leftover pizza  L: Tamales (corn and pork)  D: burrito  Snack: cupcake  Denies any sweets/sugary drinks  Beverages:Trying to do more water, coffee, diet Dr. Pepper      Regular exercise, at least 10-15 mins, 5x/wk (weights/treadmill).  Will also start going for walks since it's starting to warm up now.      No other concerns today.     Patient's most recent   Lab Results   Component Value Date    A1C 7.9 2025     is not meeting goal of <7.0    Care Plan and Education Provided:  {Care Plan and Education Provided:617857}    Patient verbalized understanding of diabetes self-management education concepts discussed, opportunities for ongoing education and support, and recommendations provided today.    Plan    Titrating ozempic upwards  Routed  "chart to PCP    Topics to cover at upcoming visits: Healthy Eating, Problem Solving, and Reducing Risks    Follow-up:  Upcoming Diabetes Ed Appointments     Visit Type Date Time Department    DIABETES ED 3/31/2025 12:00 PM [facility] DIABETES EDUCATION        See Care Plan for co-developed, patient-state behavior change goals.    Education Materials Provided:  No new materials provided today      Subjective/Objective  Tyler is an 54 year old year old, presenting for the following diabetes education related to: Diabetes education in the past 24mo: (Patient-Rptd) Yes  Diabetes type: (Patient-Rptd) Type 2  Disease course: (Patient-Rptd) Stable  How confident are you filling out medical forms by yourself:: (Patient-Rptd) Extremely  Cultural Influences/Ethnic Background:  Not  or     Diabetes Symptoms & Complications:  Diabetes Related Symptoms: (Patient-Rptd) Fatigue  Weight trend: (Patient-Rptd) Stable  Symptom course: (Patient-Rptd) Stable  Disease course: (Patient-Rptd) Stable     Patient Problem List and Family Medical History reviewed for relevant medical history, current medical status, and diabetes risk factors.    Vitals:  There were no vitals taken for this visit.  Estimated body mass index is 31.32 kg/m  as calculated from the following:    Height as of 10/25/24: 1.727 m (5' 8\").    Weight as of 10/25/24: 93.4 kg (206 lb).   Last 3 BP:   BP Readings from Last 3 Encounters:   10/25/24 119/83   04/08/24 134/88   01/31/23 126/82       History   Smoking Status     Never   Smokeless Tobacco     Never       Labs:  Lab Results   Component Value Date    A1C 7.9 02/21/2025     Lab Results   Component Value Date     04/08/2024     02/18/2021     Lab Results   Component Value Date    LDL 85 04/08/2024     Direct Measure HDL   Date Value Ref Range Status   04/08/2024 42 >=40 mg/dL Final   ]  GFR Estimate   Date Value Ref Range Status   04/08/2024 84 >60 mL/min/1.73m2 Final   02/18/2021 >60 >60 " "mL/min/1.73m2 Final     GFR Estimate If Black   Date Value Ref Range Status   2021 >60 >60 mL/min/1.73m2 Final     Lab Results   Component Value Date    CR 1.05 2024     No results found for: \"MICROALBUMIN\"    {Core Behaviors Assessed Today:559874}    Autumn Nickerson RD  Time Spent: 30 minutes  Encounter Type: Individual    Any diabetes medication dose changes were made via the [facility] Standing Orders under the patient's referring provider.     Diabetes Self-Management Education & Support    Presents for:      ** called pt - he had forgotten about the video visit and wanted to continue on with the phone visit.    Type of Service: Telephone Visit    Originating Location (Patient Location): Home  Distant Location (Provider Location): Offsite      Assessment  Follow up diabetes ed visit   Unable to fully assess - no detailed SMBG data available. Pt denies any s/s of hyperglycemia.  A1c: 7.9 () > 7.7 (2024) . A1c has been >7% for almost a year now.  Pt shares that he has not been able to eat the most healthfully. Endorses consuming chips, fries, beer etc on a regular basis.  Has been regular with the exercise though.     Currently takin mg metformin  0.5 mg weekly ozempic () - no SE  Upcoming PCP appt on : recommend titrating ozempic upwards for further control.   *also routing chart to PCP     SMBG:  Tests BG 1-2x/d.   Per report BS readings range 140s - 200s      Consumes 3 meals daily. Eats out at least 4-5x/wk.    Likes to have Sobe water (unsweetened)  Food recall:  BF:Leftover pizza  L: Tamales (corn and pork)  D: burrito  Snack: cupcake  Denies any sweets/sugary drinks  Beverages:Trying to do more water, coffee, occasional diet Dr. Adams      Regular exercise, at least 10-15 mins, 5x/wk (weights/treadmill).  Will also start going for walks since it's starting to warm up now.      No other concerns today.     Patient's most recent   Lab Results   Component Value Date    A1C " 7.9 02/21/2025     is not meeting goal of <7.0    Care Plan and Education Provided:  Healthy Eating: Balanced meals, Consistency in amount and timing of carbohydrate intake, Plate planning method, Portion control, and Weight Management, Being Active: Finding a physical activity routine that works for you, Monitoring: Individual glucose targets and Purpose, Reducing Risks: Goal for A1c, how it relates to glucose and how often to check and Prevention, early diagnostic measures and treatment of complications, and Healthy Coping: Benefits of making appropriate lifestyle changes and Identifying helpful resources    Patient verbalized understanding of diabetes self-management education concepts discussed, opportunities for ongoing education and support, and recommendations provided today.    Plan    Recommend titrating ozempic upwards for further control - routed chart to PCP    Continue to test BG regularly and keep a BG log for review.  Pt to continue focusing on eating healthfully - watching CHO foods and portions, follow the plate method.  Plan ahead for healthy meals and snacks. Incorporate more non starchy veg and lean protein to your meals and snacks.  Regular activity as able/safe    Topics to cover at upcoming visits: Healthy Eating, Problem Solving, and Reducing Risks    Follow-up:  Upcoming Diabetes Ed Appointments     Visit Type Date Time Department    DIABETES ED 3/31/2025 12:00 PM [facility] DIABETES EDUCATION      Follow up:7/7    See Care Plan for co-developed, patient-state behavior change goals.    Education Materials Provided:  No new materials provided today      Subjective/Objective  Tyler is an 54 year old year old, presenting for the following diabetes education related to: Diabetes education in the past 24mo: (Patient-Rptd) Yes  Diabetes type: (Patient-Rptd) Type 2  Disease course: (Patient-Rptd) Stable  How confident are you filling out medical forms by yourself:: (Patient-Rptd) Extremely  Cultural  "Influences/Ethnic Background:  Not  or     Diabetes Symptoms & Complications:  Diabetes Related Symptoms: (Patient-Rptd) Fatigue  Weight trend: (Patient-Rptd) Stable  Symptom course: (Patient-Rptd) Stable  Disease course: (Patient-Rptd) Stable     Patient Problem List and Family Medical History reviewed for relevant medical history, current medical status, and diabetes risk factors.    Vitals:  There were no vitals taken for this visit.  Estimated body mass index is 31.32 kg/m  as calculated from the following:    Height as of 10/25/24: 1.727 m (5' 8\").    Weight as of 10/25/24: 93.4 kg (206 lb).   Last 3 BP:   BP Readings from Last 3 Encounters:   10/25/24 119/83   04/08/24 134/88   01/31/23 126/82       History   Smoking Status     Never   Smokeless Tobacco     Never       Labs:  Lab Results   Component Value Date    A1C 7.9 02/21/2025     Lab Results   Component Value Date     04/08/2024     02/18/2021     Lab Results   Component Value Date    LDL 85 04/08/2024     Direct Measure HDL   Date Value Ref Range Status   04/08/2024 42 >=40 mg/dL Final   ]  GFR Estimate   Date Value Ref Range Status   04/08/2024 84 >60 mL/min/1.73m2 Final   02/18/2021 >60 >60 mL/min/1.73m2 Final     GFR Estimate If Black   Date Value Ref Range Status   02/18/2021 >60 >60 mL/min/1.73m2 Final     Lab Results   Component Value Date    CR 1.05 04/08/2024     No results found for: \"MICROALBUMIN\"    Healthy Eating:  Cultural/Uatsdin diet restrictions?: (Patient-Rptd) No  How many times a week on average do you eat food made away from home (restaurant/take-out)?: (Patient-Rptd) 3  Meals include: (Patient-Rptd) Breakfast, Lunch, Dinner  Beverages: (Patient-Rptd) Water, Coffee, Juice, Diet soda, Soda, Alcohol    Being Active:  Barrier to exercise: (Patient-Rptd) None  Reducing Risks:  Has dilated eye exam at least once a year?: (Patient-Rptd) Yes  Sees dentist every 6 months?: (Patient-Rptd) Yes  Feet checked by " healthcare provider in the last year?: (Patient-Rptd) Yes    Autumn Nickerson RD  Time Spent: 35 minutes  Encounter Type: Individual    Any diabetes medication dose changes were made via the [facility] Standing Orders under the patient's referring provider.

## 2025-03-31 NOTE — PROGRESS NOTES
Diabetes Self-Management Education & Support    Presents for:      ** called pt - he had forgotten about the video visit and wanted to continue on with the phone visit.    Type of Service: Telephone Visit    Originating Location (Patient Location): Home  Distant Location (Provider Location): Offsite      Assessment  Follow up diabetes ed visit   Unable to fully assess - no detailed SMBG data available. Pt denies any s/s of hyperglycemia.  A1c: 7.9 () > 7.7 (2024) . A1c has been >7% for almost a year now.  Pt shares that he has not been able to eat the most healthfully. Endorses consuming chips, fries, beer etc on a regular basis.  Has been regular with the exercise though.     Currently takin mg metformin  0.5 mg weekly ozempic () - no SE  Upcoming PCP appt on : recommend titrating ozempic upwards for further control.   *also routing chart to PCP     SMBG:  Tests BG 1-2x/d.   Per report BS readings range 140s - 200s      Consumes 3 meals daily. Eats out at least 4-5x/wk.    Likes to have Sobe water (unsweetened)  Food recall:  BF:Leftover pizza  L: Tamales (corn and pork)  D: burrito  Snack: cupcake  Denies any sweets/sugary drinks  Beverages:Trying to do more water, coffee, occasional diet Dr. Adams      Regular exercise, at least 10-15 mins, 5x/wk (weights/treadmill).  Will also start going for walks since it's starting to warm up now.      No other concerns today.     Patient's most recent   Lab Results   Component Value Date    A1C 7.9 2025     is not meeting goal of <7.0    Care Plan and Education Provided:  Healthy Eating: Balanced meals, Consistency in amount and timing of carbohydrate intake, Plate planning method, Portion control, and Weight Management, Being Active: Finding a physical activity routine that works for you, Monitoring: Individual glucose targets and Purpose, Reducing Risks: Goal for A1c, how it relates to glucose and how often to check and Prevention,  early diagnostic measures and treatment of complications, and Healthy Coping: Benefits of making appropriate lifestyle changes and Identifying helpful resources    Patient verbalized understanding of diabetes self-management education concepts discussed, opportunities for ongoing education and support, and recommendations provided today.    Plan    Recommend titrating ozempic upwards for further control - routed chart to PCP    Continue to test BG regularly and keep a BG log for review.  Pt to continue focusing on eating healthfully - watching CHO foods and portions, follow the plate method.  Plan ahead for healthy meals and snacks. Incorporate more non starchy veg and lean protein to your meals and snacks.  Regular activity as able/safe    Topics to cover at upcoming visits: Healthy Eating, Problem Solving, and Reducing Risks    Follow-up:  Upcoming Diabetes Ed Appointments     Visit Type Date Time Department    DIABETES ED 3/31/2025 12:00 PM [facility] DIABETES EDUCATION      Follow up:7/7 (pt prefers/requests for a phone visit)    See Care Plan for co-developed, patient-state behavior change goals.    Education Materials Provided:  No new materials provided today      Subjective/Objective  Tyler is an 54 year old year old, presenting for the following diabetes education related to: Diabetes education in the past 24mo: (Patient-Rptd) Yes  Diabetes type: (Patient-Rptd) Type 2  Disease course: (Patient-Rptd) Stable  How confident are you filling out medical forms by yourself:: (Patient-Rptd) Extremely  Cultural Influences/Ethnic Background:  Not  or     Diabetes Symptoms & Complications:  Diabetes Related Symptoms: (Patient-Rptd) Fatigue  Weight trend: (Patient-Rptd) Stable  Symptom course: (Patient-Rptd) Stable  Disease course: (Patient-Rptd) Stable     Patient Problem List and Family Medical History reviewed for relevant medical history, current medical status, and diabetes risk  "factors.    Vitals:  There were no vitals taken for this visit.  Estimated body mass index is 31.32 kg/m  as calculated from the following:    Height as of 10/25/24: 1.727 m (5' 8\").    Weight as of 10/25/24: 93.4 kg (206 lb).   Last 3 BP:   BP Readings from Last 3 Encounters:   10/25/24 119/83   04/08/24 134/88   01/31/23 126/82       History   Smoking Status    Never   Smokeless Tobacco    Never       Labs:  Lab Results   Component Value Date    A1C 7.9 02/21/2025     Lab Results   Component Value Date     04/08/2024     02/18/2021     Lab Results   Component Value Date    LDL 85 04/08/2024     Direct Measure HDL   Date Value Ref Range Status   04/08/2024 42 >=40 mg/dL Final   ]  GFR Estimate   Date Value Ref Range Status   04/08/2024 84 >60 mL/min/1.73m2 Final   02/18/2021 >60 >60 mL/min/1.73m2 Final     GFR Estimate If Black   Date Value Ref Range Status   02/18/2021 >60 >60 mL/min/1.73m2 Final     Lab Results   Component Value Date    CR 1.05 04/08/2024     No results found for: \"MICROALBUMIN\"    Healthy Eating:  Cultural/Islam diet restrictions?: (Patient-Rptd) No  How many times a week on average do you eat food made away from home (restaurant/take-out)?: (Patient-Rptd) 3  Meals include: (Patient-Rptd) Breakfast, Lunch, Dinner  Beverages: (Patient-Rptd) Water, Coffee, Juice, Diet soda, Soda, Alcohol    Being Active:  Barrier to exercise: (Patient-Rptd) None  Reducing Risks:  Has dilated eye exam at least once a year?: (Patient-Rptd) Yes  Sees dentist every 6 months?: (Patient-Rptd) Yes  Feet checked by healthcare provider in the last year?: (Patient-Rptd) Yes    Autumn Nickerson RD  Time Spent: 35 minutes  Encounter Type: Individual    Any diabetes medication dose changes were made via the [facility] Standing Orders under the patient's referring provider.   Answers submitted by the patient for this visit:  Questionnaire about: Diabetes problem (Submitted on 3/31/2025)  Chief Complaint: Diabetes " problem

## 2025-06-07 ENCOUNTER — HEALTH MAINTENANCE LETTER (OUTPATIENT)
Age: 55
End: 2025-06-07

## 2025-06-23 ENCOUNTER — VIRTUAL VISIT (OUTPATIENT)
Dept: EDUCATION SERVICES | Facility: CLINIC | Age: 55
End: 2025-06-23
Payer: COMMERCIAL

## 2025-06-23 DIAGNOSIS — E11.9 DIABETES MELLITUS (H): Primary | ICD-10-CM

## 2025-06-23 PROCEDURE — G0108 DIAB MANAGE TRN  PER INDIV: HCPCS | Mod: 93 | Performed by: DIETITIAN, REGISTERED

## 2025-06-23 NOTE — PROGRESS NOTES
Diabetes Self-Management Education & Support    Presents for:      ++ pt had power outage, hence we switched to a telephone visit    Type of Service: Telephone Visit    Originating Location (Patient Location): Home  Distant Location (Provider Location): Offsite  Mode of Communication:  Telephone    Assessment    Follow up visit   Most recent A1c: 7.9 (), due for a redraw  Unable to fully assess - no detailed SMBG data available  Pt shares that he has been trying to eat healthfully, although could do better still..    Exercising more and sleeping better. Is also trying to drink more water     Currently takin mg metformin, 0.5 mg weekly ozempic () - denies any SE  ++Pt has not been interested in a higher dose of ozempic (due to fear of SE)  Future: considering another agent/SGLT-2 if next A1c not at goal      SMBG:  Tests BG 1-2x/d, typically fasting  Per report, BS readings range 150s -180s      Consumes 3 meals daily. Eats out at least 4-5x/wk.    Food recall:  BF:chicken biscuit  L: veg fried rice, 2 eggs  D: [employer] cheese burger, fries  Snack: cupcake  Occasional desserts - states it's grad party season  Beverages:Trying to do more water, coffee, occasional beer  Also likes to have Sobe water (unsweetened) or propell     Regular exercise: 4 - 5x/wk, alternates between upper body and lower body      Patient's most recent   Lab Results   Component Value Date    A1C 7.9 2025     is not meeting goal of <7.0    Care Plan and Education Provided:  Healthy Eating: Consistency in amount and timing of carbohydrate intake, Plate planning method, and Portion control  Being Active: Finding a physical activity routine that works for you  Monitoring: Frequency of monitoring, Individual glucose targets, and Purpose  Taking Medication: Action of prescribed medication(s), Side effects of prescribed medication(s), and When to take medication(s)  Reducing Risks: A1C - goals, relating to blood glucose  "levels, how often to check    Patient verbalized understanding of diabetes self-management education concepts discussed, opportunities for ongoing education and support, and recommendations provided today.    Plan    Continue with current therapy.   Future: considering another agent/SGLT-2 if next A1c not at goal   Pt to call to schedule A1c recheck  Regular activity. Walk for a few mins after dinner     Topics to cover at upcoming visits: Problem Solving and Reducing Risks    See Care Plan for co-developed, patient-state behavior change goals.    Education Materials Provided:  No new materials provided today      Subjective/Objective  Tyler is an 54 year old, presenting for the following diabetes education related to:    Diabetes education in the past 24mo: (Patient-Rptd) Yes  Diabetes type: (Patient-Rptd) Type 2  Disease course: (Patient-Rptd) Stable  How confident are you filling out medical forms by yourself:: (Patient-Rptd) Extremely  Cultural Influences/Ethnic Background:  Not  or     Diabetes Symptoms & Complications:  Diabetes Related Symptoms: (Patient-Rptd) None  Weight trend: (Patient-Rptd) Stable  Symptom course: (Patient-Rptd) Stable  Disease course: (Patient-Rptd) Stable     Patient Problem List and Family Medical History reviewed for relevant medical history, current medical status, and diabetes risk factors.    Vitals:  There were no vitals taken for this visit.  Estimated body mass index is 30.71 kg/m  as calculated from the following:    Height as of 4/18/25: 1.727 m (5' 8\").    Weight as of 4/18/25: 91.6 kg (202 lb).   Last 3 BP:   BP Readings from Last 3 Encounters:   04/18/25 116/78   10/25/24 119/83   04/08/24 134/88       History   Smoking Status    Never   Smokeless Tobacco    Never       Labs:  Lab Results   Component Value Date    A1C 7.9 02/21/2025     Lab Results   Component Value Date     04/18/2025     02/18/2021     Lab Results   Component Value Date    " LDL 57 04/18/2025     Direct Measure HDL   Date Value Ref Range Status   04/18/2025 46 >=40 mg/dL Final     GFR Estimate   Date Value Ref Range Status   04/18/2025 76 >60 mL/min/1.73m2 Final     Comment:     eGFR calculated using 2021 CKD-EPI equation.   02/18/2021 >60 >60 mL/min/1.73m2 Final     GFR Estimate If Black   Date Value Ref Range Status   02/18/2021 >60 >60 mL/min/1.73m2 Final     Lab Results   Component Value Date    CR 1.14 04/18/2025     Lab Results   Component Value Date    MICROL <12.0 04/08/2024    UMALCR  04/08/2024      Comment:      Unable to calculate, urine albumin and/or urine creatinine is outside detectable limits.  Microalbuminuria is defined as an albumin:creatinine ratio of 17 to 299 for males and 25 to 299 for females. A ratio of albumin:creatinine of 300 or higher is indicative of overt proteinuria.  Due to biologic variability, positive results should be confirmed by a second, first-morning random or 24-hour timed urine specimen. If there is discrepancy, a third specimen is recommended. When 2 out of 3 results are in the microalbuminuria range, this is evidence for incipient nephropathy and warrants increased efforts at glucose control, blood pressure control, and institution of therapy with an angiotensin-converting-enzyme (ACE) inhibitor (if the patient can tolerate it).      UCRR 137.0 04/08/2024 6/21/2025   Healthy Eating   Cultural/Hindu diet restrictions? No   How many times a week on average do you eat food made away from home (restaurant/take-out)? 4   Meals include Breakfast;Lunch;Dinner;Evening Snack   Beverages Water;Coffee;Milk;Juice;Diet soda         6/21/2025   Being Active   Barrier to exercise Time;Physical limitation         6/21/2025   Monitoring   Blood Glucose Meter One Touch   Times checking blood sugar at home (number) 1   Times checking blood sugar at home (per) Day       Diabetes Medication(s)       Biguanides       metFORMIN (GLUCOPHAGE XR) 500 MG  24 hr tablet TAKE 4 TABLET BY MOUTH DAILY. Strength: 500 mg       Incretin Mimetic Agents       semaglutide (OZEMPIC, 0.25 OR 0.5 MG/DOSE,) 2 MG/3ML pen Inject 0.5 mg subcutaneously every 7 days.              6/21/2025   Taking Medications   Current Treatments Diet;Non-insulin Injectables;Oral Medication (taken by mouth)         12/8/2021   Problem Solving   Is the patient at risk for hypoglycemia? Bruna Nickerson RDN, YUE, [facility]  Diabetes Care and Education  [phone] (Triage)     Time Spent: 40 minutes  Encounter Type: Individual    Any diabetes medication dose changes were made via the [facility] Standing Orders under the patient's referring provider.   Answers submitted by the patient for this visit:  Questionnaire about: Diabetes problem (Submitted on 6/21/2025)  Chief Complaint: Diabetes problem

## 2025-06-23 NOTE — LETTER
2025         RE: Tyler Turk  [id number] [id number]        Dear Colleague,    Thank you for referring your patient, Tyler Turk, to the [facility]. Please see a copy of my visit note below.    Diabetes Self-Management Education & Support    Presents for:      ++ pt had power outage, hence we switched to a telephone visit    Type of Service: Telephone Visit    Originating Location (Patient Location): Home  Distant Location (Provider Location): Offsite  Mode of Communication:  Telephone    Assessment    Follow up visit   Most recent A1c: 7.9 (), due for a redraw  Unable to fully assess - no detailed SMBG data available  Pt shares that he has been trying to eat healthfully, although could do better still..    Exercising more and sleeping better. Is also trying to drink more water     Currently takin mg metformin, 0.5 mg weekly ozempic () - denies any SE  ++Pt has not been interested in a higher dose of ozempic (due to fear of SE)  Future: considering another agent/SGLT-2 if next A1c not at goal      SMBG:  Tests BG 1-2x/d, typically fasting  Per report, BS readings range 150s -180s      Consumes 3 meals daily. Eats out at least 4-5x/wk.    Food recall:  BF:chicken biscuit  L: veg fried rice, 2 eggs  D: [employer] cheese burger, fries  Snack: cupcake  Occasional desserts - states it's grad party season  Beverages:Trying to do more water, coffee, occasional beer  Also likes to have Sobe water (unsweetened) or propell     Regular exercise: 4 - 5x/wk, alternates between upper body and lower body      Patient's most recent   Lab Results   Component Value Date    A1C 7.9 2025     is not meeting goal of <7.0    Care Plan and Education Provided:  Healthy Eating: Consistency in amount and timing of carbohydrate intake, Plate planning method, and Portion control  Being Active: Finding a physical activity routine that works for  "you  Monitoring: Frequency of monitoring, Individual glucose targets, and Purpose  Taking Medication: Action of prescribed medication(s), Side effects of prescribed medication(s), and When to take medication(s)  Reducing Risks: A1C - goals, relating to blood glucose levels, how often to check    Patient verbalized understanding of diabetes self-management education concepts discussed, opportunities for ongoing education and support, and recommendations provided today.    Plan    Continue with current therapy.   Future: considering another agent/SGLT-2 if next A1c not at goal   Pt to call to schedule A1c recheck  Regular activity. Walk for a few mins after dinner     Topics to cover at upcoming visits: Problem Solving and Reducing Risks    See Care Plan for co-developed, patient-state behavior change goals.    Education Materials Provided:  No new materials provided today      Subjective/Objective  Tyler is an 54 year old, presenting for the following diabetes education related to:    Diabetes education in the past 24mo: (Patient-Rptd) Yes  Diabetes type: (Patient-Rptd) Type 2  Disease course: (Patient-Rptd) Stable  How confident are you filling out medical forms by yourself:: (Patient-Rptd) Extremely  Cultural Influences/Ethnic Background:  Not  or     Diabetes Symptoms & Complications:  Diabetes Related Symptoms: (Patient-Rptd) None  Weight trend: (Patient-Rptd) Stable  Symptom course: (Patient-Rptd) Stable  Disease course: (Patient-Rptd) Stable     Patient Problem List and Family Medical History reviewed for relevant medical history, current medical status, and diabetes risk factors.    Vitals:  There were no vitals taken for this visit.  Estimated body mass index is 30.71 kg/m  as calculated from the following:    Height as of 4/18/25: 1.727 m (5' 8\").    Weight as of 4/18/25: 91.6 kg (202 lb).   Last 3 BP:   BP Readings from Last 3 Encounters:   04/18/25 116/78   10/25/24 119/83   04/08/24 " 134/88       History   Smoking Status     Never   Smokeless Tobacco     Never       Labs:  Lab Results   Component Value Date    A1C 7.9 02/21/2025     Lab Results   Component Value Date     04/18/2025     02/18/2021     Lab Results   Component Value Date    LDL 57 04/18/2025     Direct Measure HDL   Date Value Ref Range Status   04/18/2025 46 >=40 mg/dL Final     GFR Estimate   Date Value Ref Range Status   04/18/2025 76 >60 mL/min/1.73m2 Final     Comment:     eGFR calculated using 2021 CKD-EPI equation.   02/18/2021 >60 >60 mL/min/1.73m2 Final     GFR Estimate If Black   Date Value Ref Range Status   02/18/2021 >60 >60 mL/min/1.73m2 Final     Lab Results   Component Value Date    CR 1.14 04/18/2025     Lab Results   Component Value Date    MICROL <12.0 04/08/2024    UMALCR  04/08/2024      Comment:      Unable to calculate, urine albumin and/or urine creatinine is outside detectable limits.  Microalbuminuria is defined as an albumin:creatinine ratio of 17 to 299 for males and 25 to 299 for females. A ratio of albumin:creatinine of 300 or higher is indicative of overt proteinuria.  Due to biologic variability, positive results should be confirmed by a second, first-morning random or 24-hour timed urine specimen. If there is discrepancy, a third specimen is recommended. When 2 out of 3 results are in the microalbuminuria range, this is evidence for incipient nephropathy and warrants increased efforts at glucose control, blood pressure control, and institution of therapy with an angiotensin-converting-enzyme (ACE) inhibitor (if the patient can tolerate it).      UCRR 137.0 04/08/2024 6/21/2025   Healthy Eating   Cultural/Yazidism diet restrictions? No   How many times a week on average do you eat food made away from home (restaurant/take-out)? 4   Meals include Breakfast;Lunch;Dinner;Evening Snack   Beverages Water;Coffee;Milk;Juice;Diet soda         6/21/2025   Being Active   Barrier to  exercise Time;Physical limitation         6/21/2025   Monitoring   Blood Glucose Meter One Touch   Times checking blood sugar at home (number) 1   Times checking blood sugar at home (per) Day       Diabetes Medication(s)       Biguanides       metFORMIN (GLUCOPHAGE XR) 500 MG 24 hr tablet TAKE 4 TABLET BY MOUTH DAILY. Strength: 500 mg       Incretin Mimetic Agents       semaglutide (OZEMPIC, 0.25 OR 0.5 MG/DOSE,) 2 MG/3ML pen Inject 0.5 mg subcutaneously every 7 days.              6/21/2025   Taking Medications   Current Treatments Diet;Non-insulin Injectables;Oral Medication (taken by mouth)         12/8/2021   Problem Solving   Is the patient at risk for hypoglycemia? YUE Huntley RDN, [facility]  Diabetes Care and Education  [phone] (Triage)     Time Spent: 40 minutes  Encounter Type: Individual    Any diabetes medication dose changes were made via the [facility] Standing Orders under the patient's referring provider.   Answers submitted by the patient for this visit:  Questionnaire about: Diabetes problem (Submitted on 6/21/2025)  Chief Complaint: Diabetes problem

## 2025-08-22 ENCOUNTER — TRANSFERRED RECORDS (OUTPATIENT)
Dept: ADMINISTRATIVE | Facility: CLINIC | Age: 55
End: 2025-08-22
Payer: COMMERCIAL

## 2025-08-25 PROBLEM — D12.6 ADENOMATOUS POLYP OF COLON: Status: ACTIVE | Noted: 2025-08-25

## 2025-08-26 ENCOUNTER — PATIENT OUTREACH (OUTPATIENT)
Dept: GASTROENTEROLOGY | Facility: CLINIC | Age: 55
End: 2025-08-26
Payer: COMMERCIAL